# Patient Record
Sex: MALE | Race: WHITE | NOT HISPANIC OR LATINO | Employment: OTHER | URBAN - NONMETROPOLITAN AREA
[De-identification: names, ages, dates, MRNs, and addresses within clinical notes are randomized per-mention and may not be internally consistent; named-entity substitution may affect disease eponyms.]

---

## 2018-07-05 ENCOUNTER — HOSPITAL ENCOUNTER (INPATIENT)
Facility: HOSPITAL | Age: 73
LOS: 3 days | Discharge: HOME | End: 2018-07-08
Attending: FAMILY MEDICINE | Admitting: INTERNAL MEDICINE

## 2018-07-05 ENCOUNTER — APPOINTMENT (OUTPATIENT)
Dept: CT IMAGING | Facility: HOSPITAL | Age: 73
End: 2018-07-05

## 2018-07-05 DIAGNOSIS — M54.2 NECK PAIN: ICD-10-CM

## 2018-07-05 DIAGNOSIS — R52 INTRACTABLE PAIN: Primary | ICD-10-CM

## 2018-07-05 PROBLEM — R50.9 FEVER: Status: ACTIVE | Noted: 2018-07-05

## 2018-07-05 PROBLEM — I10 HYPERTENSION: Status: ACTIVE | Noted: 2018-07-05

## 2018-07-05 PROBLEM — R51.9 HEADACHE: Status: ACTIVE | Noted: 2018-07-05

## 2018-07-05 PROBLEM — R74.01 TRANSAMINITIS: Status: ACTIVE | Noted: 2018-07-05

## 2018-07-05 LAB
ALBUMIN SERPL-MCNC: 4 G/DL (ref 3.5–5)
ALBUMIN/GLOB SERPL: 1.4 G/DL (ref 1.1–2.5)
ALP SERPL-CCNC: 71 U/L (ref 24–120)
ALT SERPL W P-5'-P-CCNC: 60 U/L (ref 0–54)
ANION GAP SERPL CALCULATED.3IONS-SCNC: 11 MMOL/L (ref 4–13)
AST SERPL-CCNC: 42 U/L (ref 7–45)
BASOPHILS # BLD AUTO: 0.05 10*3/MM3 (ref 0–0.2)
BASOPHILS NFR BLD AUTO: 0.5 % (ref 0–2)
BILIRUB SERPL-MCNC: 0.8 MG/DL (ref 0.1–1)
BUN BLD-MCNC: 15 MG/DL (ref 5–21)
BUN/CREAT SERPL: 18.3 (ref 7–25)
CALCIUM SPEC-SCNC: 8.9 MG/DL (ref 8.4–10.4)
CHLORIDE SERPL-SCNC: 97 MMOL/L (ref 98–110)
CO2 SERPL-SCNC: 28 MMOL/L (ref 24–31)
CREAT BLD-MCNC: 0.82 MG/DL (ref 0.5–1.4)
CRP SERPL-MCNC: 4.21 MG/DL (ref 0–0.99)
DEPRECATED RDW RBC AUTO: 43.3 FL (ref 40–54)
EOSINOPHIL # BLD AUTO: 0.01 10*3/MM3 (ref 0–0.7)
EOSINOPHIL NFR BLD AUTO: 0.1 % (ref 0–4)
ERYTHROCYTE [DISTWIDTH] IN BLOOD BY AUTOMATED COUNT: 13.2 % (ref 12–15)
ERYTHROCYTE [SEDIMENTATION RATE] IN BLOOD: 3 MM/HR (ref 0–15)
GFR SERPL CREATININE-BSD FRML MDRD: 93 ML/MIN/1.73
GLOBULIN UR ELPH-MCNC: 2.8 GM/DL
GLUCOSE BLD-MCNC: 108 MG/DL (ref 70–100)
HCT VFR BLD AUTO: 48.7 % (ref 40–52)
HGB BLD-MCNC: 16.4 G/DL (ref 14–18)
IMM GRANULOCYTES # BLD: 0.24 10*3/MM3 (ref 0–0.03)
IMM GRANULOCYTES NFR BLD: 2.5 % (ref 0–5)
LYMPHOCYTES # BLD AUTO: 0.98 10*3/MM3 (ref 0.72–4.86)
LYMPHOCYTES NFR BLD AUTO: 10.2 % (ref 15–45)
MCH RBC QN AUTO: 29.9 PG (ref 28–32)
MCHC RBC AUTO-ENTMCNC: 33.7 G/DL (ref 33–36)
MCV RBC AUTO: 88.7 FL (ref 82–95)
MONOCYTES # BLD AUTO: 0.47 10*3/MM3 (ref 0.19–1.3)
MONOCYTES NFR BLD AUTO: 4.9 % (ref 4–12)
NEUTROPHILS # BLD AUTO: 7.9 10*3/MM3 (ref 1.87–8.4)
NEUTROPHILS NFR BLD AUTO: 81.8 % (ref 39–78)
NRBC BLD MANUAL-RTO: 0 /100 WBC (ref 0–0)
PLATELET # BLD AUTO: 129 10*3/MM3 (ref 130–400)
PMV BLD AUTO: 8.4 FL (ref 6–12)
POTASSIUM BLD-SCNC: 3.8 MMOL/L (ref 3.5–5.3)
PROCALCITONIN SERPL-MCNC: 0.26 NG/ML
PROT SERPL-MCNC: 6.8 G/DL (ref 6.3–8.7)
RBC # BLD AUTO: 5.49 10*6/MM3 (ref 4.8–5.9)
SODIUM BLD-SCNC: 136 MMOL/L (ref 135–145)
TROPONIN I SERPL-MCNC: 0.02 NG/ML (ref 0–0.03)
WBC NRBC COR # BLD: 9.65 10*3/MM3 (ref 4.8–10.8)

## 2018-07-05 PROCEDURE — 25010000002 ENOXAPARIN PER 10 MG: Performed by: FAMILY MEDICINE

## 2018-07-05 PROCEDURE — 25010000002 HYDROMORPHONE PER 4 MG: Performed by: NURSE PRACTITIONER

## 2018-07-05 PROCEDURE — 25010000002 KETOROLAC TROMETHAMINE PER 15 MG: Performed by: NURSE PRACTITIONER

## 2018-07-05 PROCEDURE — 99285 EMERGENCY DEPT VISIT HI MDM: CPT

## 2018-07-05 PROCEDURE — 86140 C-REACTIVE PROTEIN: CPT | Performed by: FAMILY MEDICINE

## 2018-07-05 PROCEDURE — 93010 ELECTROCARDIOGRAM REPORT: CPT | Performed by: INTERNAL MEDICINE

## 2018-07-05 PROCEDURE — 25010000002 METHYLPREDNISOLONE PER 125 MG: Performed by: NURSE PRACTITIONER

## 2018-07-05 PROCEDURE — 84145 PROCALCITONIN (PCT): CPT | Performed by: FAMILY MEDICINE

## 2018-07-05 PROCEDURE — 87040 BLOOD CULTURE FOR BACTERIA: CPT | Performed by: NURSE PRACTITIONER

## 2018-07-05 PROCEDURE — 94799 UNLISTED PULMONARY SVC/PX: CPT

## 2018-07-05 PROCEDURE — 80053 COMPREHEN METABOLIC PANEL: CPT | Performed by: NURSE PRACTITIONER

## 2018-07-05 PROCEDURE — 72125 CT NECK SPINE W/O DYE: CPT

## 2018-07-05 PROCEDURE — 93005 ELECTROCARDIOGRAM TRACING: CPT | Performed by: NURSE PRACTITIONER

## 2018-07-05 PROCEDURE — 84484 ASSAY OF TROPONIN QUANT: CPT | Performed by: NURSE PRACTITIONER

## 2018-07-05 PROCEDURE — 25010000002 ONDANSETRON PER 1 MG: Performed by: NURSE PRACTITIONER

## 2018-07-05 PROCEDURE — 85651 RBC SED RATE NONAUTOMATED: CPT | Performed by: NURSE PRACTITIONER

## 2018-07-05 PROCEDURE — 85025 COMPLETE CBC W/AUTO DIFF WBC: CPT | Performed by: NURSE PRACTITIONER

## 2018-07-05 RX ORDER — ACETAMINOPHEN 325 MG/1
650 TABLET ORAL EVERY 4 HOURS PRN
Status: DISCONTINUED | OUTPATIENT
Start: 2018-07-05 | End: 2018-07-08 | Stop reason: HOSPADM

## 2018-07-05 RX ORDER — TIZANIDINE 4 MG/1
4 TABLET ORAL EVERY 8 HOURS PRN
Status: DISCONTINUED | OUTPATIENT
Start: 2018-07-05 | End: 2018-07-08 | Stop reason: HOSPADM

## 2018-07-05 RX ORDER — ONDANSETRON 2 MG/ML
4 INJECTION INTRAMUSCULAR; INTRAVENOUS ONCE
Status: COMPLETED | OUTPATIENT
Start: 2018-07-05 | End: 2018-07-05

## 2018-07-05 RX ORDER — OXYCODONE AND ACETAMINOPHEN 7.5; 325 MG/1; MG/1
1 TABLET ORAL EVERY 4 HOURS PRN
Status: DISCONTINUED | OUTPATIENT
Start: 2018-07-05 | End: 2018-07-08 | Stop reason: HOSPADM

## 2018-07-05 RX ORDER — TIZANIDINE 4 MG/1
4 TABLET ORAL EVERY 8 HOURS PRN
Status: ON HOLD | COMMUNITY
End: 2018-07-08

## 2018-07-05 RX ORDER — METHYLPREDNISOLONE SODIUM SUCCINATE 125 MG/2ML
125 INJECTION, POWDER, LYOPHILIZED, FOR SOLUTION INTRAMUSCULAR; INTRAVENOUS ONCE
Status: COMPLETED | OUTPATIENT
Start: 2018-07-05 | End: 2018-07-05

## 2018-07-05 RX ORDER — LOSARTAN POTASSIUM 50 MG/1
100 TABLET ORAL DAILY
Status: ON HOLD | COMMUNITY
End: 2018-07-08

## 2018-07-05 RX ORDER — SODIUM CHLORIDE 0.9 % (FLUSH) 0.9 %
1-10 SYRINGE (ML) INJECTION AS NEEDED
Status: DISCONTINUED | OUTPATIENT
Start: 2018-07-05 | End: 2018-07-08 | Stop reason: HOSPADM

## 2018-07-05 RX ORDER — ATORVASTATIN CALCIUM 10 MG/1
20 TABLET, FILM COATED ORAL NIGHTLY
Status: DISCONTINUED | OUTPATIENT
Start: 2018-07-05 | End: 2018-07-05 | Stop reason: SDUPTHER

## 2018-07-05 RX ORDER — AMLODIPINE BESYLATE 2.5 MG/1
2.5 TABLET ORAL DAILY
COMMUNITY
End: 2021-06-17

## 2018-07-05 RX ORDER — KETOROLAC TROMETHAMINE 15 MG/ML
15 INJECTION, SOLUTION INTRAMUSCULAR; INTRAVENOUS ONCE
Status: COMPLETED | OUTPATIENT
Start: 2018-07-05 | End: 2018-07-05

## 2018-07-05 RX ORDER — AMLODIPINE BESYLATE 5 MG/1
2.5 TABLET ORAL DAILY
Status: DISCONTINUED | OUTPATIENT
Start: 2018-07-05 | End: 2018-07-08 | Stop reason: HOSPADM

## 2018-07-05 RX ORDER — SIMVASTATIN 40 MG
40 TABLET ORAL NIGHTLY
COMMUNITY

## 2018-07-05 RX ORDER — BISACODYL 5 MG/1
5 TABLET, DELAYED RELEASE ORAL DAILY PRN
Status: DISCONTINUED | OUTPATIENT
Start: 2018-07-05 | End: 2018-07-08 | Stop reason: HOSPADM

## 2018-07-05 RX ORDER — LOSARTAN POTASSIUM 50 MG/1
100 TABLET ORAL DAILY
Status: DISCONTINUED | OUTPATIENT
Start: 2018-07-05 | End: 2018-07-07

## 2018-07-05 RX ORDER — HYDROCODONE BITARTRATE AND ACETAMINOPHEN 7.5; 325 MG/1; MG/1
1 TABLET ORAL EVERY 4 HOURS PRN
Status: DISCONTINUED | OUTPATIENT
Start: 2018-07-05 | End: 2018-07-07

## 2018-07-05 RX ORDER — HYDROMORPHONE HYDROCHLORIDE 1 MG/ML
0.5 INJECTION, SOLUTION INTRAMUSCULAR; INTRAVENOUS; SUBCUTANEOUS ONCE
Status: COMPLETED | OUTPATIENT
Start: 2018-07-05 | End: 2018-07-05

## 2018-07-05 RX ORDER — ALUMINA, MAGNESIA, AND SIMETHICONE 2400; 2400; 240 MG/30ML; MG/30ML; MG/30ML
15 SUSPENSION ORAL EVERY 6 HOURS PRN
Status: DISCONTINUED | OUTPATIENT
Start: 2018-07-05 | End: 2018-07-08 | Stop reason: HOSPADM

## 2018-07-05 RX ORDER — ATORVASTATIN CALCIUM 10 MG/1
20 TABLET, FILM COATED ORAL DAILY
Status: DISCONTINUED | OUTPATIENT
Start: 2018-07-06 | End: 2018-07-08 | Stop reason: HOSPADM

## 2018-07-05 RX ORDER — ONDANSETRON 2 MG/ML
4 INJECTION INTRAMUSCULAR; INTRAVENOUS EVERY 6 HOURS PRN
Status: DISCONTINUED | OUTPATIENT
Start: 2018-07-05 | End: 2018-07-08 | Stop reason: HOSPADM

## 2018-07-05 RX ADMIN — METHYLPREDNISOLONE SODIUM SUCCINATE 125 MG: 125 INJECTION, POWDER, FOR SOLUTION INTRAMUSCULAR; INTRAVENOUS at 09:51

## 2018-07-05 RX ADMIN — ENOXAPARIN SODIUM 40 MG: 40 INJECTION SUBCUTANEOUS at 18:28

## 2018-07-05 RX ADMIN — HYDROMORPHONE HYDROCHLORIDE 0.5 MG: 1 INJECTION, SOLUTION INTRAMUSCULAR; INTRAVENOUS; SUBCUTANEOUS at 09:48

## 2018-07-05 RX ADMIN — KETOROLAC TROMETHAMINE 15 MG: 15 INJECTION, SOLUTION INTRAMUSCULAR; INTRAVENOUS at 09:49

## 2018-07-05 RX ADMIN — ONDANSETRON 4 MG: 2 INJECTION, SOLUTION INTRAMUSCULAR; INTRAVENOUS at 09:47

## 2018-07-05 RX ADMIN — SODIUM CHLORIDE 500 ML: 9 INJECTION, SOLUTION INTRAVENOUS at 09:47

## 2018-07-05 RX ADMIN — DOXYCYCLINE 100 MG: 100 INJECTION, POWDER, LYOPHILIZED, FOR SOLUTION INTRAVENOUS at 18:29

## 2018-07-05 NOTE — PLAN OF CARE
Problem: Patient Care Overview  Goal: Plan of Care Review  Outcome: Ongoing (interventions implemented as appropriate)   07/05/18 3741   Coping/Psychosocial   Plan of Care Reviewed With patient   Plan of Care Review   Progress no change   OTHER   Outcome Summary Admitted with a one month history of neck pain. Seen at [facility] and followed by PCP with no relief. Awaiting visit from hospitalist and Dr. Woods.        Problem: Pain, Acute (Adult)  Goal: Acceptable Pain Control/Comfort Level  Outcome: Ongoing (interventions implemented as appropriate)

## 2018-07-05 NOTE — PROGRESS NOTES
Orthopedic Spine Progress Note    Maxx Siu  73 y.o.  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  He also had a fever with profuse sweating.  The patient has had problems with arthritis in both shoulders and had been laying awkwardly on his couch with his head in a flexed position against the armrest for several days.  He denies any specific trauma or other injuries.  His neck pain comes and goes but does seem to be associated with neck range of motion.  He has been quite active.  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  He describes no symptoms radiating down the arms and he denies any weakness.  He also denies any bowel or bladder incontinence.  He does have a history of prostate issues and has dribbled urine at times recently but this is of unknown significance.    He is complaining today mainly of right sided neck pain as well as symptoms into the right trapezius.  About a month ago, he had extreme stiffness in his neck with difficulty turning his head to drive.  His primary care physician Dr. Gustavo Hairston in [place] has given him oral steroids for the last 10-12 days with minimal relief, he also was given a prescription for tizanidine.  Here in the [facility] ER he was given Solu-Medrol and Toradol.      He did have a temperature in the emergency room today and has been sweating significantly.  Several tests have been ordered for a workup regarding possible tickborne illness.    Objective     Vital signs in last 24 hours:  Temp:  [97.4 °F (36.3 °C)-100.1 °F (37.8 °C)] 97.4 °F (36.3 °C)  Heart Rate:  [60-89] 60  Resp:  [18] 18  BP: (126-161)/(44-97) 131/71    Physical Exam:    His neck range of motion today is actually reasonably well preserved.  He is nontender to palpation over the  paraspinous musculature the cervical spine or the trapezius.  He appears to have normal strength in the deltoids, biceps, triceps.  He has normal  strength.  Sensation is intact in all nerve system fusions.  Donta's is negative.  He moves both lower extremities appropriately with no difficulty.    Diagnostic Data:  CBC:  Results from last 7 days  Lab Units 07/05/18  0946   WBC 10*3/mm3 9.65   RBC 10*6/mm3 5.49   HEMOGLOBIN g/dL 16.4   HEMATOCRIT % 48.7   PLATELETS 10*3/mm3 129*      CT scan cervical spine reveals diffuse degenerative disc disease with multilevel facet arthropathy.    Assessment/Plan     1.  Right-sided neck pain  2.  Bilateral shoulder radiculopathy, right worse than left  3.  Multilevel cervical degenerative disc disease, worse C5-7  4.  Multilevel cervical facet arthropathy  5.  Possible cervical disc herniation  6.  Possible global infectious process    Plan:  1.  Pain control  2.  MRI cervical ordered already, will follow  3.  Hold off on more steroids for now per primary team      JYOTI Woods MD    Date: 7/5/2018  Time: 5:37 PM

## 2018-07-05 NOTE — PLAN OF CARE
Problem: Pain, Acute (Adult)  Goal: Identify Related Risk Factors and Signs and Symptoms  Outcome: Outcome(s) achieved Date Met: 07/05/18

## 2018-07-05 NOTE — ED PROVIDER NOTES
Subjective   Patient is a 73-year-old male presents emergency Department with complaints of neck pain.  Patient states that he has had neck pain for approximately a month.  Patient was evaluated at [facility] on 06/03/2018 with complaints of neck pain and headache symptoms.  On this date patient had a CT scan of his head which was found to be unremarkable for acute findings.  Labs were all unremarkable as well.  Lumbar puncture was also performed to rule out meningitis.  This was found to be within normal limits as well.  CT scan was read as remote lacunar infarct within the right basal ganglia new when compared to the prior exam of 07/25/2013, remote lacunar infarct within the left basal ganglia unchanged, minimal mild deep white matter disease, chronic right ethmoid sinusitis, exam is otherwise within normal limits for patient's age with no acute intracranial abnormality identified.  Patient was offered admission on this date for intractable pain however declined.  He was sent home with naproxen, Tylenol with Codeine, and Zofran.  He was instructed to follow-up with primary care physician.  Patient states he is now on his second round of prednisone pack as well as muscle relaxers.  He continues to have neck pain that radiates up into his head.  Today he has recorded temp of 100.6.  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  He has had no insect or tick bites that he is aware of.  Due to symptoms described he elected to come the emergency department for evaluation treatment.  Patient notes that he has an appointment with Dr. Cruz on July 18 however due to continued symptoms did not feel he could wait until this date.        Neck Pain   Pain location:  R side  Quality:  Unable to specify  Timing:  Intermittent  Chronicity:  Chronic  Context: not recent injury    Relieved by: being still helps with the pain.  Worsened by:  Position  Ineffective treatments:  NSAIDs  and muscle relaxants  Associated symptoms: fever and headaches    Associated symptoms: no chest pain, no numbness, no photophobia and no visual change    Risk factors: no hx of head and neck radiation, no recent head injury and no recurrent falls        Review of Systems   Constitutional: Positive for activity change, diaphoresis and fever. Negative for appetite change, chills and fatigue.   HENT: Negative for congestion, ear pain and sinus pressure.    Eyes: Negative for photophobia, pain and visual disturbance.   Respiratory: Negative for cough, chest tightness, shortness of breath and wheezing.    Cardiovascular: Negative for chest pain.   Gastrointestinal: Negative for abdominal pain, diarrhea, nausea and vomiting.   Musculoskeletal: Positive for neck pain. Negative for arthralgias, back pain and neck stiffness.   Skin: Negative for color change and rash.   Neurological: Positive for headaches. Negative for dizziness and numbness.   Psychiatric/Behavioral: Negative for confusion and sleep disturbance. The patient is not nervous/anxious.        Past Medical History:   Diagnosis Date   • Basal cell carcinoma     r ear   • Hypertension        Allergies   Allergen Reactions   • Lortab [Hydrocodone-Acetaminophen] Nausea And Vomiting       Past Surgical History:   Procedure Laterality Date   • KNEE MENISCECTOMY Right        History reviewed. No pertinent family history.    Social History     Social History   • Marital status: Single     Social History Main Topics   • Smoking status: Never Smoker   • Smokeless tobacco: Never Used   • Alcohol use Yes      Comment: 2-3 times weekly; liquor and beer   • Drug use: No     Other Topics Concern   • Not on file           Objective   Physical Exam   Constitutional: He is oriented to person, place, and time. He appears well-developed and well-nourished. No distress.   HENT:   Head: Atraumatic.   Nose: Nose normal.   Mouth/Throat: Oropharynx is clear and moist.   Eyes:  Conjunctivae are normal. Pupils are equal, round, and reactive to light. No scleral icterus.   Neck: Normal range of motion. Neck supple. No JVD present. No thyromegaly present.   Patient has diffuse soft tissue tenderness of the cervical spine with significant pain to the right side of the neck; no vertebral point tenderness; pain worse with positioning   Cardiovascular: Normal rate, regular rhythm, normal heart sounds and intact distal pulses.    No murmur heard.  Pulmonary/Chest: Effort normal and breath sounds normal. No respiratory distress. He has no wheezes. He has no rales. He exhibits no tenderness.   Abdominal: Soft. Bowel sounds are normal. He exhibits no distension and no mass. There is no tenderness. There is no rebound and no guarding.   Musculoskeletal: Normal range of motion. He exhibits no edema.   Lymphadenopathy:     He has no cervical adenopathy.   Neurological: He is alert and oriented to person, place, and time. He has normal reflexes. No cranial nerve deficit. Coordination normal.   Skin: Skin is warm and dry. Capillary refill takes less than 2 seconds. No rash noted. He is not diaphoretic. No erythema. No pallor.   Psychiatric: He has a normal mood and affect. His behavior is normal. Judgment and thought content normal.   Nursing note and vitals reviewed.      Procedures           ED Course on reexam patient has little improvement of overall pain.  He is quite diaphoretic.  Patient's gown and bed linens had to be changed due to extensive sweating.  Cool wash rags were applied.  Plan at this time is to admit with intractable neck pain.  Please see hospitalist note for further details.  ED Course as of Jul 05 1312   Thu Jul 05, 2018   1214 Twelve-lead EKG at 1144 reveals a sinus rate at 62/m no acute ST changes no PVCs no ectopic beats noted.  [KP]      ED Course User Index  [KP] Kimber SAUL MD                  [facility]  Number of Diagnoses or Management Options  Intractable pain: new and  requires workup  Neck pain: new and requires workup     Amount and/or Complexity of Data Reviewed  Clinical lab tests: ordered  Tests in the radiology section of CPT®: ordered and reviewed  Decide to obtain previous medical records or to obtain history from someone other than the patient: yes  Obtain history from someone other than the patient: yes  Discuss the patient with other providers: yes    Risk of Complications, Morbidity, and/or Mortality  Presenting problems: moderate  Diagnostic procedures: moderate  Management options: moderate    Patient Progress  Patient progress: improved        Final diagnoses:   Intractable pain   Neck pain            Gemma Dahl, ABELARDO  07/05/18 1325       Gemma Dahl, ABELARDO  07/05/18 1337

## 2018-07-05 NOTE — H&P
[facility] Emergency Room Medicine Services  HISTORY AND PHYSICAL    Date of Admission: 7/5/2018  Primary Care Physician: Gustavo Hairston MD    Subjective     Chief Complaint:   Neck pain and fever    History of Present Illness    73-year-old white male is admitted with a chief bite of neck pain and fever.  The patient states that his pain has been ongoing now for roughly 1 month and is associated with stiffness.  He went to [facility] today for the aforementioned symptoms and they performed a CT scan of the head which showed no acute findings.  Laboratory evaluation was stated as unremarkable.  A lumbar puncture was performed to rule out meningitis and was found to be within normal limits.  Patient has had an outpatient evaluation performed by his family physician consisting of x-rays which showed mild degenerative changes in the cervical spine.  He has been treated with muscle relaxants, anti-inflammatories and tapering steroid packs without relief of his symptoms.  While in our emergency department the patient developed a fever associated with profuse sweating and was subsequently admitted.  The patient admits to recurrent nocturnal sweats with subjective fever.      The patient denies any changes in his activity.  He works full-time as a farmer.  He does not relate any recent history of insect or tick bites or tick borne illness certainly remains strongly in the differential.      Review of Systems   Constitutional: Positive for diaphoresis and fever.   HENT: Negative.    Eyes: Negative.    Respiratory: Negative.    Cardiovascular: Negative.    Gastrointestinal: Negative.    Endocrine: Negative.    Genitourinary: Negative.    Musculoskeletal: Positive for myalgias, neck pain and neck stiffness.   Skin: Negative.    Allergic/Immunologic: Negative.    Neurological: Negative.    Hematological: Negative.    Psychiatric/Behavioral: Negative.         Otherwise complete ROS reviewed  "and negative except as mentioned in the HPI.      Past Medical History:     Past Medical History:   Diagnosis Date   • Arthritis    • Basal cell carcinoma     r ear   • Basal cell carcinoma    • Elevated cholesterol    • Hypertension        Past Surgical History:  Past Surgical History:   Procedure Laterality Date   • KNEE MENISCECTOMY Right        Family History:   family history includes COPD in his father; Cancer in his father and mother; Dementia in his mother; Diabetes in his mother; Hyperlipidemia in his sister; Hypertension in his sister; Myasthenia gravis in his father; No Known Problems in his brother, brother, and sister.    Social History:    reports that he has never smoked. He has never used smokeless tobacco. He reports that he drinks about 1.8 oz of alcohol per week . He reports that he does not use drugs.    Medications:  Prior to Admission medications    Medication Sig Start Date End Date Taking? Authorizing Provider   amLODIPine (NORVASC) 2.5 MG tablet Take 2.5 mg by mouth Daily.   Yes Historical Provider, MD   losartan (COZAAR) 50 MG tablet Take 100 mg by mouth Daily.   Yes Historical Provider, MD   simvastatin (ZOCOR) 40 MG tablet Take 40 mg by mouth Every Night.   Yes Historical Provider, MD   tiZANidine (ZANAFLEX) 4 MG tablet Take 4 mg by mouth Every 8 (Eight) Hours As Needed for Muscle Spasms.   Yes Historical Provider, MD   Ezetimibe-Simvastatin (VYTORIN PO) Take  by mouth.  7/5/18 Yes Historical Provider, MD   LOSARTAN POTASSIUM PO Take  by mouth.  7/5/18 Yes Historical Provider, MD   PREDNISONE PO Take  by mouth.  7/5/18 Yes Historical Provider, MD   UNKNOWN TO PATIENT amlodipine  7/5/18 Yes Historical Provider, MD       Allergies:  Allergies   Allergen Reactions   • Lortab [Hydrocodone-Acetaminophen] Nausea And Vomiting       Objective     Vital Signs:   /71 (BP Location: Left arm, Patient Position: Lying)   Pulse 60   Temp 97.4 °F (36.3 °C) (Oral)   Resp 18   Ht 172.7 cm (68\")  "  Wt 94.5 kg (208 lb 6.4 oz)   SpO2 97%   BMI 31.69 kg/m²     Physical Exam   Constitutional: He is oriented to person, place, and time. He appears well-developed and well-nourished. He is cooperative. He does not have a sickly appearance. No distress.   HENT:   Head: Normocephalic and atraumatic.   Right Ear: External ear normal.   Left Ear: External ear normal.   Nose: Nose normal.   Mouth/Throat: Oropharynx is clear and moist.   Eyes: Conjunctivae and EOM are normal. Pupils are equal, round, and reactive to light. No scleral icterus.   Neck: Neck supple. No JVD present. Carotid bruit is not present. No neck rigidity. No tracheal deviation present. No thyromegaly present.   There is mild to moderate palpable spasm in the right posterior cervical region.  Passive range of motion appears to be within normal limits.  Active range of motion appears to be mildly decreased.     Cardiovascular: Normal rate, regular rhythm, normal heart sounds and intact distal pulses.    No murmur heard.  Pulmonary/Chest: Effort normal and breath sounds normal. No respiratory distress. He has no wheezes.   Abdominal: Soft. Bowel sounds are normal. He exhibits no distension. There is no tenderness. A hernia is present. Hernia confirmed positive in the ventral area.   Musculoskeletal: Normal range of motion. He exhibits edema (trace  BLE). He exhibits no tenderness.   Neurological: He is alert and oriented to person, place, and time. No cranial nerve deficit. Coordination normal.   Skin: Skin is warm and dry. No rash noted.   There are no visible target lesions and no perceptible skin eruptions.    Psychiatric: He has a normal mood and affect. His behavior is normal. Judgment and thought content normal.           Results Reviewed:  Lab Results (last 24 hours)     Procedure Component Value Units Date/Time    Procalcitonin [[id number]]  (Abnormal) Collected:  07/05/18 0946    Specimen:  Blood Updated:  07/05/18 1400     Procalcitonin 0.26  (H) ng/mL     C-reactive Protein [[id number]]  (Abnormal) Collected:  07/05/18 0946    Specimen:  Blood Updated:  07/05/18 1343     C-Reactive Protein 4.21 (H) mg/dL     Blood Culture - Blood, Blood, Venous Line [[id number]] Collected:  07/05/18 1154    Specimen:  Blood from Hand, Left Updated:  07/05/18 1229    Blood Culture - Blood, Blood, Venous Line [[id number]] Collected:  07/05/18 1156    Specimen:  Blood from Hand, Left Updated:  07/05/18 1229    Troponin [[id number]]  (Normal) Collected:  07/05/18 0946    Specimen:  Blood Updated:  07/05/18 1205     Troponin I 0.023 ng/mL     Sedimentation Rate [[id number]]  (Normal) Collected:  07/05/18 0946    Specimen:  Blood Updated:  07/05/18 1010     Sed Rate 3 mm/hr     Comprehensive Metabolic Panel [[id number]]  (Abnormal) Collected:  07/05/18 0946    Specimen:  Blood Updated:  07/05/18 1007     Glucose 108 (H) mg/dL      BUN 15 mg/dL      Creatinine 0.82 mg/dL      Sodium 136 mmol/L      Potassium 3.8 mmol/L      Chloride 97 (L) mmol/L      CO2 28.0 mmol/L      Calcium 8.9 mg/dL      Total Protein 6.8 g/dL      Albumin 4.00 g/dL      ALT (SGPT) 60 (H) U/L      AST (SGOT) 42 U/L      Alkaline Phosphatase 71 U/L      Total Bilirubin 0.8 mg/dL      eGFR Non African Amer 93 mL/min/1.73      Globulin 2.8 gm/dL      A/G Ratio 1.4 g/dL      BUN/Creatinine Ratio 18.3     Anion Gap 11.0 mmol/L     CBC Auto Differential [[id number]]  (Abnormal) Collected:  07/05/18 0946    Specimen:  Blood Updated:  07/05/18 1001     WBC 9.65 10*3/mm3      RBC 5.49 10*6/mm3      Hemoglobin 16.4 g/dL      Hematocrit 48.7 %      MCV 88.7 fL      MCH 29.9 pg      MCHC 33.7 g/dL      RDW 13.2 %      RDW-SD 43.3 fl      MPV 8.4 fL      Platelets 129 (L) 10*3/mm3      Neutrophil % 81.8 (H) %      Lymphocyte % 10.2 (L) %      Monocyte % 4.9 %      Eosinophil % 0.1 %      Basophil % 0.5 %      Immature Grans % 2.5 %      Neutrophils, Absolute 7.90 10*3/mm3      Lymphocytes, Absolute 0.98 10*3/mm3       Monocytes, Absolute 0.47 10*3/mm3      Eosinophils, Absolute 0.01 10*3/mm3      Basophils, Absolute 0.05 10*3/mm3      Immature Grans, Absolute 0.24 (H) 10*3/mm3      nRBC 0.0 /100 WBC           Ct Cervical Spine Without Contrast    Result Date: 7/5/2018  Narrative: CT CERVICAL SPINE WO CONTRAST- 7/5/2018 10:53 AM CDT  HISTORY: neck pain  COMPARISON: None  DOSE LENGTH PRODUCT: 306 mGy cm. Automated exposure control was also utilized to decrease patient radiation dose.  TECHNIQUE: Serial helical tomographic images of the cervical spine were obtained without the use of intravenous contrast. Additionally, sagittal and coronal reformatted images were also provided for review.  FINDINGS: Alignment: Normal.  Bones: There is no evidence of fracture. Vertebral body heights are maintained.  Disc spaces: Maintained. Small osteophytes are present at C5-C6 and C6-C7.  Canal and neuroforamina: Patent. Degenerative changes are present in the RIGHT C4-C5 and C5-C6 facet joints.  Soft tissues: Unremarkable.  Lung apices: Clear. No pneumothorax.      Impression: 1. Mild degenerative changes in the cervical spine without evidence of acute bony abnormality.   This report was finalized on 07/05/2018 11:10 by Dr. Gustavo Kong MD.     I have personally reviewed and interpreted the radiology studies and ECG obtained at time of admission.     Assessment / Plan      Assessment & Plan  Hospital Problem List     * (Principal)Intractable pain    Fever    Transaminitis    Cervicalgia    Hypertension    Headache          PLAN:   Admit to the medical surgical floor  Ehrlichia chaffeensis, PCR  RMSF antibodies  MRI scan cervical spine  Consult neurosurgery at the family's request  Doxycycline 100 mg IV every 12 hours    Code Status:   Full  Surrogate decision-maker is the patient's wife     I discussed the patient's findings and my recommendations with: The patient, his wife    Estimated length of stay: Unknown    Ok Workman DO    07/05/18   5:19 PM

## 2018-07-06 ENCOUNTER — APPOINTMENT (OUTPATIENT)
Dept: MRI IMAGING | Facility: HOSPITAL | Age: 73
End: 2018-07-06

## 2018-07-06 LAB
ALBUMIN SERPL-MCNC: 3.6 G/DL (ref 3.5–5)
ALBUMIN/GLOB SERPL: 1.3 G/DL (ref 1.1–2.5)
ALP SERPL-CCNC: 69 U/L (ref 24–120)
ALT SERPL W P-5'-P-CCNC: 69 U/L (ref 0–54)
ANION GAP SERPL CALCULATED.3IONS-SCNC: 14 MMOL/L (ref 4–13)
AST SERPL-CCNC: 54 U/L (ref 7–45)
BASOPHILS # BLD AUTO: 0.02 10*3/MM3 (ref 0–0.2)
BASOPHILS NFR BLD AUTO: 0.2 % (ref 0–2)
BILIRUB SERPL-MCNC: 0.7 MG/DL (ref 0.1–1)
BUN BLD-MCNC: 29 MG/DL (ref 5–21)
BUN/CREAT SERPL: 22 (ref 7–25)
CALCIUM SPEC-SCNC: 8.8 MG/DL (ref 8.4–10.4)
CHLORIDE SERPL-SCNC: 99 MMOL/L (ref 98–110)
CO2 SERPL-SCNC: 27 MMOL/L (ref 24–31)
CREAT BLD-MCNC: 1.32 MG/DL (ref 0.5–1.4)
DEPRECATED RDW RBC AUTO: 43.6 FL (ref 40–54)
EOSINOPHIL # BLD AUTO: 0 10*3/MM3 (ref 0–0.7)
EOSINOPHIL NFR BLD AUTO: 0 % (ref 0–4)
ERYTHROCYTE [DISTWIDTH] IN BLOOD BY AUTOMATED COUNT: 13.3 % (ref 12–15)
GFR SERPL CREATININE-BSD FRML MDRD: 53 ML/MIN/1.73
GLOBULIN UR ELPH-MCNC: 2.8 GM/DL
GLUCOSE BLD-MCNC: 144 MG/DL (ref 70–100)
HBA1C MFR BLD: 6.2 %
HCT VFR BLD AUTO: 46.5 % (ref 40–52)
HGB BLD-MCNC: 15.9 G/DL (ref 14–18)
IMM GRANULOCYTES # BLD: 0.11 10*3/MM3 (ref 0–0.03)
IMM GRANULOCYTES NFR BLD: 1 % (ref 0–5)
LYMPHOCYTES # BLD AUTO: 0.69 10*3/MM3 (ref 0.72–4.86)
LYMPHOCYTES NFR BLD AUTO: 6 % (ref 15–45)
MCH RBC QN AUTO: 30.5 PG (ref 28–32)
MCHC RBC AUTO-ENTMCNC: 34.2 G/DL (ref 33–36)
MCV RBC AUTO: 89.1 FL (ref 82–95)
MONOCYTES # BLD AUTO: 0.43 10*3/MM3 (ref 0.19–1.3)
MONOCYTES NFR BLD AUTO: 3.7 % (ref 4–12)
NEUTROPHILS # BLD AUTO: 10.22 10*3/MM3 (ref 1.87–8.4)
NEUTROPHILS NFR BLD AUTO: 89.1 % (ref 39–78)
NRBC BLD MANUAL-RTO: 0 /100 WBC (ref 0–0)
PLATELET # BLD AUTO: 133 10*3/MM3 (ref 130–400)
PMV BLD AUTO: 9.4 FL (ref 6–12)
POTASSIUM BLD-SCNC: 4.5 MMOL/L (ref 3.5–5.3)
PROT SERPL-MCNC: 6.4 G/DL (ref 6.3–8.7)
RBC # BLD AUTO: 5.22 10*6/MM3 (ref 4.8–5.9)
SODIUM BLD-SCNC: 140 MMOL/L (ref 135–145)
TSH SERPL DL<=0.05 MIU/L-ACNC: 0.74 MIU/ML (ref 0.47–4.68)
WBC NRBC COR # BLD: 11.47 10*3/MM3 (ref 4.8–10.8)

## 2018-07-06 PROCEDURE — 94760 N-INVAS EAR/PLS OXIMETRY 1: CPT

## 2018-07-06 PROCEDURE — 25010000002 ENOXAPARIN PER 10 MG: Performed by: FAMILY MEDICINE

## 2018-07-06 PROCEDURE — 86757 RICKETTSIA ANTIBODY: CPT | Performed by: FAMILY MEDICINE

## 2018-07-06 PROCEDURE — 84443 ASSAY THYROID STIM HORMONE: CPT | Performed by: FAMILY MEDICINE

## 2018-07-06 PROCEDURE — 87798 DETECT AGENT NOS DNA AMP: CPT | Performed by: FAMILY MEDICINE

## 2018-07-06 PROCEDURE — 72141 MRI NECK SPINE W/O DYE: CPT

## 2018-07-06 PROCEDURE — 94799 UNLISTED PULMONARY SVC/PX: CPT

## 2018-07-06 PROCEDURE — 83036 HEMOGLOBIN GLYCOSYLATED A1C: CPT | Performed by: FAMILY MEDICINE

## 2018-07-06 PROCEDURE — 80053 COMPREHEN METABOLIC PANEL: CPT | Performed by: FAMILY MEDICINE

## 2018-07-06 PROCEDURE — 85025 COMPLETE CBC W/AUTO DIFF WBC: CPT | Performed by: FAMILY MEDICINE

## 2018-07-06 RX ADMIN — Medication 10 ML: at 05:20

## 2018-07-06 RX ADMIN — LOSARTAN POTASSIUM 100 MG: 50 TABLET, FILM COATED ORAL at 09:36

## 2018-07-06 RX ADMIN — ATORVASTATIN CALCIUM 20 MG: 10 TABLET, FILM COATED ORAL at 09:36

## 2018-07-06 RX ADMIN — ENOXAPARIN SODIUM 40 MG: 40 INJECTION SUBCUTANEOUS at 18:40

## 2018-07-06 RX ADMIN — DOXYCYCLINE 100 MG: 100 INJECTION, POWDER, LYOPHILIZED, FOR SOLUTION INTRAVENOUS at 18:36

## 2018-07-06 RX ADMIN — AMLODIPINE BESYLATE 2.5 MG: 5 TABLET ORAL at 09:35

## 2018-07-06 RX ADMIN — DOXYCYCLINE 100 MG: 100 INJECTION, POWDER, LYOPHILIZED, FOR SOLUTION INTRAVENOUS at 05:20

## 2018-07-06 NOTE — PROGRESS NOTES
[facility] Medicine Services  INPATIENT PROGRESS NOTE    Length of Stay: 1  Date of Admission: 7/5/2018  Primary Care Physician: Gustavo Hairston MD    Subjective     Chief Complaint:     Neck pain and fever    HPI     The patient's neck pain is significantly improved.  C-spine stiffness is significantly improved as well.  He has been afebrile since starting IV antibiotics yesterday.  Formal reading of the patient's cervical MRI is pending but to my untrained eye I see no evidence of central cord impingement or impingement of the exiting nerve roots.  Studies for ehrlichiosis and RMSF are pending.  The patient feels significantly better and is anxious to return home but is willing to stay for another day or 2 to receive IV antibiotics.    Review of Systems     All pertinent negatives and positives are as above. All other systems have been reviewed and are negative unless otherwise stated.     Objective    Temp:  [97.2 °F (36.2 °C)-98.7 °F (37.1 °C)] 97.8 °F (36.6 °C)  Heart Rate:  [56-81] 72  Resp:  [16-18] 16  BP: (115-137)/(66-74) 127/74    Lab Results (last 24 hours)     Procedure Component Value Units Date/Time    Blood Culture - Blood, Blood, Venous Line [[id number]]  (Normal) Collected:  07/05/18 1154    Specimen:  Blood from Hand, Left Updated:  07/06/18 1230     Blood Culture No growth at 24 hours    Blood Culture - Blood, Blood, Venous Line [[id number]]  (Normal) Collected:  07/05/18 1156    Specimen:  Blood from Hand, Left Updated:  07/06/18 1230     Blood Culture No growth at 24 hours    Hemoglobin A1c [[id number]] Collected:  07/06/18 0503    Specimen:  Blood Updated:  07/06/18 0712     Hemoglobin A1C 6.2 %     Narrative:       Less than 6.0           Non-Diabetic Range  6.0-7.0                 ADA Therapeutic Target  Greater than 7.0        Action Suggested    TSH [[id number]]  (Normal) Collected:  07/06/18 0503    Specimen:  Blood Updated:  07/06/18 0641     TSH 0.738  mIU/mL     Comprehensive Metabolic Panel [[id number]]  (Abnormal) Collected:  07/06/18 0503    Specimen:  Blood Updated:  07/06/18 0641     Glucose 144 (H) mg/dL      BUN 29 (H) mg/dL      Creatinine 1.32 mg/dL      Sodium 140 mmol/L      Potassium 4.5 mmol/L      Chloride 99 mmol/L      CO2 27.0 mmol/L      Calcium 8.8 mg/dL      Total Protein 6.4 g/dL      Albumin 3.60 g/dL      ALT (SGPT) 69 (H) U/L      AST (SGOT) 54 (H) U/L      Alkaline Phosphatase 69 U/L      Total Bilirubin 0.7 mg/dL      eGFR Non African Amer 53 (L) mL/min/1.73      Globulin 2.8 gm/dL      A/G Ratio 1.3 g/dL      BUN/Creatinine Ratio 22.0     Anion Gap 14.0 (H) mmol/L     Narrative:       The MDRD GFR formula is only valid for adults with stable renal function between ages 18 and 70.    CBC & Differential [[id number]] Collected:  07/06/18 0503    Specimen:  Blood Updated:  07/06/18 0558    Narrative:       The following orders were created for panel order CBC & Differential.  Procedure                               Abnormality         Status                     ---------                               -----------         ------                     CBC Auto Differential[[id number]]        Abnormal            Final result                 Please view results for these tests on the individual orders.    CBC Auto Differential [[id number]]  (Abnormal) Collected:  07/06/18 0503    Specimen:  Blood Updated:  07/06/18 0558     WBC 11.47 (H) 10*3/mm3      RBC 5.22 10*6/mm3      Hemoglobin 15.9 g/dL      Hematocrit 46.5 %      MCV 89.1 fL      MCH 30.5 pg      MCHC 34.2 g/dL      RDW 13.3 %      RDW-SD 43.6 fl      MPV 9.4 fL      Platelets 133 10*3/mm3      Neutrophil % 89.1 (H) %      Lymphocyte % 6.0 (L) %      Monocyte % 3.7 (L) %      Eosinophil % 0.0 %      Basophil % 0.2 %      Immature Grans % 1.0 %      Neutrophils, Absolute 10.22 (H) 10*3/mm3      Lymphocytes, Absolute 0.69 (L) 10*3/mm3      Monocytes, Absolute 0.43 10*3/mm3      Eosinophils,  Absolute 0.00 10*3/mm3      Basophils, Absolute 0.02 10*3/mm3      Immature Grans, Absolute 0.11 (H) 10*3/mm3      nRBC 0.0 /100 WBC     Ehrlichia Profile DNA PCR [[id number]] Collected:  07/06/18 0503    Specimen:  Blood Updated:  07/06/18 0553    Ehrlichia Chaffeensis PCR [[id number]] Collected:  07/06/18 0503    Specimen:  Blood Updated:  07/06/18 0553    Rison SF (IgG / M) [[id number]] Collected:  07/06/18 0503    Specimen:  Blood Updated:  07/06/18 0552          Imaging Results (last 24 hours)     Procedure Component Value Units Date/Time    MRI Cervical Spine Without Contrast [[id number]] Updated:  07/06/18 1103             Intake/Output Summary (Last 24 hours) at 07/06/18 1627  Last data filed at 07/06/18 0932   Gross per 24 hour   Intake              820 ml   Output                0 ml   Net              820 ml       Physical Exam    Constitutional: He is oriented to person, place, and time. He appears well-developed and well-nourished. He is cooperative. He does not have a sickly appearance. No distress.   HENT:   Head: Normocephalic and atraumatic.   Right Ear: External ear normal.   Left Ear: External ear normal.   Nose: Nose normal.   Mouth/Throat: Oropharynx is clear and moist.   Eyes: Conjunctivae and EOM are normal. Pupils are equal, round, and reactive to light. No scleral icterus.   Neck: Neck supple. No JVD present. Carotid bruit is not present. No neck rigidity. No tracheal deviation present. No thyromegaly present.   There is minimal palpable spasm in the right posterior cervical region.  Passive range of motion appears to be within normal limits.  Active range of motion remains mildly decreased.     Cardiovascular: Normal rate, regular rhythm, normal heart sounds and intact distal pulses.    No murmur heard.  Pulmonary/Chest: Effort normal and breath sounds normal. No respiratory distress. He has no wheezes.   Abdominal: Soft. Bowel sounds are normal. He exhibits no distension. There is no  tenderness. A hernia is present. Hernia confirmed positive in the ventral area.   Musculoskeletal: Normal range of motion. He exhibits edema (trace  BLE). He exhibits no tenderness.   Neurological: He is alert and oriented to person, place, and time. No cranial nerve deficit. Coordination normal.   Skin: Skin is warm and dry. No rash noted.  There is a small red circular lesion noted on the medial right ankle consistent with fungal skin infection.    Psychiatric: He has a normal mood and affect. His behavior is normal. Judgment and thought content normal.     Results Review:  I have reviewed the labs, radiology results, and diagnostic studies since my last progress note and made treatment changes reflective of the results.   I have reviewed the current medications.    Assessment/Plan     Hospital Problem List     * (Principal)Intractable pain    Fever    Transaminitis    Cervicalgia    Hypertension    Headache          PLAN:  Continue IV doxycycline pending return of paronychia and RMSF studies    Ok Workman DO   07/06/18   4:27 PM

## 2018-07-06 NOTE — ACP (ADVANCE CARE PLANNING)
Date of First Steps ACP interview: 7/6/2018  Location of interview: Pt's room  First Steps ACP Facilitator: Ibeth Werner, RN  Referral Source: nurse  Present for facilitation: Patient    SUMMARY OF ADVANCE CARE PLANNING DISCUSSION:  Maxx visited for First Steps facilitation.     Maxx was sleeping soundly and family present ask that he not be disturbed.  Information packet along with contact numbers for facilitators left for pt to review when able to.     Education materials were provided on: Advance Care Planning and Healthcare Agent Selection         Advance care/living will document finished during this facilitation? no    Time spent on preparation, facilitation and documentation was under 30 minutes.    RECOMMENDATIONS/FOLLOW-UP:  Contact information left if pt desires further conversation.    CONSULT/NOTE ROUTED  yes    Ibeth Werner, RN

## 2018-07-06 NOTE — PROGRESS NOTES
Discharge Planning Assessment  [facility]     Patient Name: Maxx Siu  MRN: [id number]  Today's Date: 7/6/2018    Admit Date: 7/5/2018          Discharge Needs Assessment     Row Name 07/06/18 1043       Living Environment    Lives With alone    Current Living Arrangements home/apartment/condo    Primary Care Provided by self    Provides Primary Care For no one    Quality of Family Relationships supportive;helpful;involved    Able to Return to Prior Arrangements yes       Resource/Environmental Concerns    Resource/Environmental Concerns none       Transition Planning    Patient/Family Anticipates Transition to home    Patient/Family Anticipated Services at Transition none    Transportation Anticipated family or friend will provide       Discharge Needs Assessment    Readmission Within the Last 30 Days no previous admission in last 30 days    Concerns to be Addressed denies needs/concerns at this time    Equipment Currently Used at Home none    Anticipated Changes Related to Illness none    Equipment Needed After Discharge none    Discharge Coordination/Progress Pt lives at home and plans to return home at d/c. 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 He denies needs and does not want home health.            Discharge Plan    No documentation.       Destination     No service coordination in this encounter.      Durable Medical Equipment     No service coordination in this encounter.      Dialysis/Infusion     No service coordination in this encounter.      Home Medical Care     No service coordination in this encounter.      Social Care     No service coordination in this encounter.                Demographic Summary    No documentation.           Functional Status    No documentation.           Psychosocial    No documentation.           Abuse/Neglect    No documentation.           Legal    No documentation.           Substance Abuse    No documentation.           Patient Forms    No  documentation.         VETO Higgins

## 2018-07-06 NOTE — PLAN OF CARE
Problem: Patient Care Overview  Goal: Plan of Care Review  Outcome: Ongoing (interventions implemented as appropriate)   07/06/18 0334   Coping/Psychosocial   Plan of Care Reviewed With patient   Plan of Care Review   Progress no change   OTHER   Outcome Summary States he has very little pain, Refused offer offer of pain med. Sleeping throught the night. Neuro wnl. Up ad tee. MRI ordered for this morning.      Goal: Individualization and Mutuality  Outcome: Ongoing (interventions implemented as appropriate)    Goal: Discharge Needs Assessment  Outcome: Ongoing (interventions implemented as appropriate)    Goal: Interprofessional Rounds/Family Conf  Outcome: Ongoing (interventions implemented as appropriate)      Problem: Pain, Acute (Adult)  Goal: Acceptable Pain Control/Comfort Level  Outcome: Ongoing (interventions implemented as appropriate)

## 2018-07-07 LAB
ALBUMIN SERPL-MCNC: 3.4 G/DL (ref 3.5–5)
ALBUMIN/GLOB SERPL: 1.3 G/DL (ref 1.1–2.5)
ALP SERPL-CCNC: 66 U/L (ref 24–120)
ALT SERPL W P-5'-P-CCNC: 69 U/L (ref 0–54)
ANION GAP SERPL CALCULATED.3IONS-SCNC: 11 MMOL/L (ref 4–13)
AST SERPL-CCNC: 36 U/L (ref 7–45)
BASOPHILS # BLD AUTO: 0.03 10*3/MM3 (ref 0–0.2)
BASOPHILS NFR BLD AUTO: 0.3 % (ref 0–2)
BILIRUB SERPL-MCNC: 0.5 MG/DL (ref 0.1–1)
BUN BLD-MCNC: 37 MG/DL (ref 5–21)
BUN/CREAT SERPL: 23.6 (ref 7–25)
CALCIUM SPEC-SCNC: 8.9 MG/DL (ref 8.4–10.4)
CHLORIDE SERPL-SCNC: 99 MMOL/L (ref 98–110)
CO2 SERPL-SCNC: 31 MMOL/L (ref 24–31)
CREAT BLD-MCNC: 1.57 MG/DL (ref 0.5–1.4)
DEPRECATED RDW RBC AUTO: 44 FL (ref 40–54)
DEPRECATED RDW RBC AUTO: 45.1 FL (ref 40–54)
EOSINOPHIL # BLD AUTO: 0.03 10*3/MM3 (ref 0–0.7)
EOSINOPHIL NFR BLD AUTO: 0.3 % (ref 0–4)
ERYTHROCYTE [DISTWIDTH] IN BLOOD BY AUTOMATED COUNT: 13.4 % (ref 12–15)
ERYTHROCYTE [DISTWIDTH] IN BLOOD BY AUTOMATED COUNT: 13.5 % (ref 12–15)
GFR SERPL CREATININE-BSD FRML MDRD: 44 ML/MIN/1.73
GLOBULIN UR ELPH-MCNC: 2.7 GM/DL
GLUCOSE BLD-MCNC: 90 MG/DL (ref 70–100)
HCT VFR BLD AUTO: 45.3 % (ref 40–52)
HCT VFR BLD AUTO: 47.8 % (ref 40–52)
HGB BLD-MCNC: 15.4 G/DL (ref 14–18)
HGB BLD-MCNC: 15.8 G/DL (ref 14–18)
LYMPHOCYTES # BLD AUTO: 1.69 10*3/MM3 (ref 0.72–4.86)
LYMPHOCYTES # BLD MANUAL: 0.72 10*3/MM3 (ref 0.72–4.86)
LYMPHOCYTES NFR BLD AUTO: 15.4 % (ref 15–45)
LYMPHOCYTES NFR BLD MANUAL: 2 % (ref 4–12)
LYMPHOCYTES NFR BLD MANUAL: 7.1 % (ref 15–45)
MCH RBC QN AUTO: 29.9 PG (ref 28–32)
MCH RBC QN AUTO: 30.3 PG (ref 28–32)
MCHC RBC AUTO-ENTMCNC: 33.1 G/DL (ref 33–36)
MCHC RBC AUTO-ENTMCNC: 34 G/DL (ref 33–36)
MCV RBC AUTO: 89 FL (ref 82–95)
MCV RBC AUTO: 90.5 FL (ref 82–95)
MONOCYTES # BLD AUTO: 0.2 10*3/MM3 (ref 0.19–1.3)
MONOCYTES # BLD AUTO: 0.82 10*3/MM3 (ref 0.19–1.3)
MONOCYTES NFR BLD AUTO: 7.5 % (ref 4–12)
NEUTROPHILS # BLD AUTO: 7.92 10*3/MM3 (ref 1.87–8.4)
NEUTROPHILS # BLD AUTO: 8.35 10*3/MM3 (ref 1.87–8.4)
NEUTROPHILS NFR BLD AUTO: 75.8 % (ref 39–78)
NEUTROPHILS NFR BLD MANUAL: 74.5 % (ref 39–78)
NEUTS BAND NFR BLD MANUAL: 3.1 % (ref 0–10)
PLATELET # BLD AUTO: 115 10*3/MM3 (ref 130–400)
PLATELET # BLD AUTO: 122 10*3/MM3 (ref 130–400)
PMV BLD AUTO: 10.1 FL (ref 6–12)
PMV BLD AUTO: 9.5 FL (ref 6–12)
POIKILOCYTOSIS BLD QL SMEAR: ABNORMAL
POTASSIUM BLD-SCNC: 4 MMOL/L (ref 3.5–5.3)
PROT SERPL-MCNC: 6.1 G/DL (ref 6.3–8.7)
RBC # BLD AUTO: 5.09 10*6/MM3 (ref 4.8–5.9)
RBC # BLD AUTO: 5.28 10*6/MM3 (ref 4.8–5.9)
SMALL PLATELETS BLD QL SMEAR: ABNORMAL
SODIUM BLD-SCNC: 141 MMOL/L (ref 135–145)
VARIANT LYMPHS NFR BLD MANUAL: 13.3 % (ref 0–5)
WBC MORPH BLD: NORMAL
WBC NRBC COR # BLD: 10.21 10*3/MM3 (ref 4.8–10.8)
WBC NRBC COR # BLD: 11 10*3/MM3 (ref 4.8–10.8)

## 2018-07-07 PROCEDURE — 85025 COMPLETE CBC W/AUTO DIFF WBC: CPT | Performed by: INTERNAL MEDICINE

## 2018-07-07 PROCEDURE — 80053 COMPREHEN METABOLIC PANEL: CPT | Performed by: INTERNAL MEDICINE

## 2018-07-07 PROCEDURE — 25010000002 ENOXAPARIN PER 10 MG: Performed by: FAMILY MEDICINE

## 2018-07-07 PROCEDURE — 85025 COMPLETE CBC W/AUTO DIFF WBC: CPT | Performed by: FAMILY MEDICINE

## 2018-07-07 PROCEDURE — 94760 N-INVAS EAR/PLS OXIMETRY 1: CPT

## 2018-07-07 PROCEDURE — 94799 UNLISTED PULMONARY SVC/PX: CPT

## 2018-07-07 PROCEDURE — 85007 BL SMEAR W/DIFF WBC COUNT: CPT | Performed by: INTERNAL MEDICINE

## 2018-07-07 RX ORDER — DOXYCYCLINE 100 MG/1
100 TABLET ORAL EVERY 12 HOURS SCHEDULED
Status: DISCONTINUED | OUTPATIENT
Start: 2018-07-07 | End: 2018-07-08 | Stop reason: HOSPADM

## 2018-07-07 RX ORDER — SODIUM CHLORIDE 9 MG/ML
125 INJECTION, SOLUTION INTRAVENOUS CONTINUOUS
Status: DISCONTINUED | OUTPATIENT
Start: 2018-07-07 | End: 2018-07-08 | Stop reason: HOSPADM

## 2018-07-07 RX ADMIN — SODIUM CHLORIDE 125 ML/HR: 9 INJECTION, SOLUTION INTRAVENOUS at 16:33

## 2018-07-07 RX ADMIN — SODIUM CHLORIDE 1000 ML: 9 INJECTION, SOLUTION INTRAVENOUS at 15:13

## 2018-07-07 RX ADMIN — DOXYCYCLINE 100 MG: 100 INJECTION, POWDER, LYOPHILIZED, FOR SOLUTION INTRAVENOUS at 06:41

## 2018-07-07 RX ADMIN — LOSARTAN POTASSIUM 100 MG: 50 TABLET, FILM COATED ORAL at 08:14

## 2018-07-07 RX ADMIN — AMLODIPINE BESYLATE 2.5 MG: 5 TABLET ORAL at 08:14

## 2018-07-07 RX ADMIN — ENOXAPARIN SODIUM 40 MG: 40 INJECTION SUBCUTANEOUS at 17:49

## 2018-07-07 RX ADMIN — Medication 10 ML: at 06:41

## 2018-07-07 RX ADMIN — ATORVASTATIN CALCIUM 20 MG: 10 TABLET, FILM COATED ORAL at 08:14

## 2018-07-07 RX ADMIN — DOXYCYCLINE 100 MG: 100 TABLET ORAL at 17:48

## 2018-07-07 NOTE — PROGRESS NOTES
Orthopedic Spine Progress Note    Maxx Siu  73 y.o.  male  Subjective     Systemic or Specific Complaints:     Patient feeling somewhat better today.  Much less neck pain.  Wondering about the results of the tick bite tests.  Reviewed the results of the MRI with patient.  Mild spondylosis but no significant stenosis.  Patient somewhat anxious to go home.    Objective     Vital signs in last 24 hours:  Temp:  [97.5 °F (36.4 °C)-99.1 °F (37.3 °C)] 98 °F (36.7 °C)  Heart Rate:  [71-90] 90  Resp:  [16-19] 18  BP: (118-134)/(61-82) 121/61    Physical Exam:    Neck range of motion reasonably well-preserved.  Nontender to palpation of the paraspinous musculature or the trapezius.  Good strength in the deltoid, biceps, triceps.  Sensation intact in all nervous system uses both upper and lower extremity.    Diagnostic Data:  CBC:  Results from last 7 days  Lab Units 07/07/18  0446   WBC 10*3/mm3 11.00*   RBC 10*6/mm3 5.09   HEMOGLOBIN g/dL 15.4   HEMATOCRIT % 45.3   PLATELETS 10*3/mm3 122*          Assessment/Plan     1. Severe acute neck pain  2.  Probable tickborne illness      Plan:  1.  Okay to discharge from a spine perspective.  2.  Follow up as needed in the office if any recurrence of symptoms    JYOTI Woods MD    Date: 7/7/2018  Time: 10:57 AM

## 2018-07-07 NOTE — PLAN OF CARE
Problem: Patient Care Overview  Goal: Plan of Care Review  Outcome: Ongoing (interventions implemented as appropriate)   07/07/18 0412   Coping/Psychosocial   Plan of Care Reviewed With patient   Plan of Care Review   Progress improving   OTHER   Outcome Summary Patient states pain to neck about 2 out of 10. Refused offer of pain meds. Up adlib. VSS. IV abx, Safety maintained.      Goal: Individualization and Mutuality  Outcome: Ongoing (interventions implemented as appropriate)    Goal: Discharge Needs Assessment  Outcome: Ongoing (interventions implemented as appropriate)    Goal: Interprofessional Rounds/Family Conf  Outcome: Ongoing (interventions implemented as appropriate)      Problem: Pain, Acute (Adult)  Goal: Acceptable Pain Control/Comfort Level  Outcome: Ongoing (interventions implemented as appropriate)

## 2018-07-07 NOTE — PLAN OF CARE
Problem: Patient Care Overview  Goal: Plan of Care Review  Outcome: Ongoing (interventions implemented as appropriate)   07/07/18 1702   Coping/Psychosocial   Plan of Care Reviewed With patient   Plan of Care Review   Progress improving   OTHER   Outcome Summary Patient reports intermittent mild neck pain. No pain meds requested. Up adlib. VSS. IV fluid bolus with maintenance given for increased creat/bun. Possible DC tomorrow pending labs.       Problem: Pain, Acute (Adult)  Goal: Acceptable Pain Control/Comfort Level  Outcome: Ongoing (interventions implemented as appropriate)

## 2018-07-07 NOTE — PLAN OF CARE
Problem: Patient Care Overview  Goal: Plan of Care Review  Outcome: Ongoing (interventions implemented as appropriate)   07/06/18 4816   Coping/Psychosocial   Plan of Care Reviewed With patient;significant other   Plan of Care Review   Progress no change   OTHER   Outcome Summary Pt A&Ox4, denies any pain. Up adlib, IV abx. Continue to monitor. Safety maintained.      Goal: Interprofessional Rounds/Family Conf  Outcome: Ongoing (interventions implemented as appropriate)      Problem: Pain, Acute (Adult)  Goal: Acceptable Pain Control/Comfort Level  Outcome: Ongoing (interventions implemented as appropriate)

## 2018-07-07 NOTE — PROGRESS NOTES
[facility] Medicine Services  INPATIENT PROGRESS NOTE    Patient Name: Maxx Siu  Date of Admission: 7/5/2018  Today's Date: 07/07/18  Length of Stay: 2  Primary Care Physician: Gustavo Hairston MD    Subjective   Chief Complaint: neck pain  HPI   He states that he is feeling much better today.  He was actually hoping to go home.  However, he has gone into mild acute renal failure which I believe is secondary to recent Toradol administration and lack of adequate hydration.     Review of Systems   All pertinent negatives and positives are as above. All other systems have been reviewed and are negative unless otherwise stated.     Objective    Temp:  [97.5 °F (36.4 °C)-99.1 °F (37.3 °C)] 98 °F (36.7 °C)  Heart Rate:  [71-90] 82  Resp:  [16-19] 18  BP: (118-134)/(61-82) 118/72  Physical Exam   Constitutional: He is oriented to person, place, and time. He appears well-developed and well-nourished. No distress.   Up on the side of the bed.  No distress.  Family present.  Seen and discussed with his nurse, Miguel.   HENT:   Head: Normocephalic and atraumatic.   Eyes: EOM are normal. Pupils are equal, round, and reactive to light.   Neck: Neck supple.   Cardiovascular: Normal rate, regular rhythm, normal heart sounds and intact distal pulses.    Pulmonary/Chest: Effort normal and breath sounds normal.   Abdominal: Soft. Bowel sounds are normal.   Musculoskeletal: Normal range of motion. He exhibits no edema.   Lymphadenopathy:     He has no cervical adenopathy.   Neurological: He is alert and oriented to person, place, and time.   Skin: Skin is warm and dry. No rash noted.     Results Review:  I have reviewed the labs, radiology results, and diagnostic studies.    Laboratory Data:     Results from last 7 days  Lab Units 07/07/18  1319 07/07/18  0446 07/06/18  0503   WBC 10*3/mm3 10.21 11.00* 11.47*   HEMOGLOBIN g/dL 15.8 15.4 15.9   HEMATOCRIT % 47.8 45.3 46.5   PLATELETS 10*3/mm3  115* 122* 133       Results from last 7 days  Lab Units 07/07/18  1319 07/06/18  0503 07/05/18  0946   SODIUM mmol/L 141 140 136   POTASSIUM mmol/L 4.0 4.5 3.8   CHLORIDE mmol/L 99 99 97*   CO2 mmol/L 31.0 27.0 28.0   BUN mg/dL 37* 29* 15   CREATININE mg/dL 1.57* 1.32 0.82   CALCIUM mg/dL 8.9 8.8 8.9   BILIRUBIN mg/dL 0.5 0.7 0.8   ALK PHOS U/L 66 69 71   ALT (SGPT) U/L 69* 69* 60*   AST (SGOT) U/L 36 54* 42   GLUCOSE mg/dL 90 144* 108*     Culture Data:   Blood Culture   Date Value Ref Range Status   07/05/2018 No growth at 2 days  Preliminary   07/05/2018 No growth at 2 days  Preliminary     Radiology Data:   Imaging Results (last 24 hours)     Procedure Component Value Units Date/Time    MRI Cervical Spine Without Contrast [[id number]] Collected:  07/06/18 1723     Updated:  07/06/18 1735    Narrative:       EXAM:   MR CERVICAL SPINE WITHOUT IV CONTRAST 7/6/2018     COMPARISON: None.      HISTORY: 73 years-old Male.Abnormal xray, cervical spine, DJD; R52-Pain,  unspecified; M54.2-Cervicalgia     TECHNIQUE:   Routine pulse sequences of the cervical were obtained without IV  contrast.      FINDINGS:   Cervical spine:  Straightening of the normal lordosis of the cervical spine. No acute  compression deformity.  The bone marrow signal shows no evidence of fracture or a marrow  replacing process.  The cervical spinal cord signal is normal.  Disc desiccation and height loss at multiple levels.    The prevertebral soft tissues are unremarkable.        Degenerative changes:     C2-C3: Disc osteophyte complex and ligamentum flavum hypertrophy,  without significant thecal sac stenosis. Facet and uncovertebral  arthrosis, without significant left foraminal stenosis. There is mild  right foraminal stenosis.     C3-C4: Disc osteophyte complex and ligamentum flavum hypertrophy,  without significant thecal sac stenosis. Facet and uncovertebral  arthrosis, asymmetric to the left, resulting mild left foraminal  stenosis. No  significant right foraminal stenosis.     C4-C5: No significant thecal sac stenosis. Facet uncovertebral  arthrosis, without significant foraminal stenosis.     C5-C6: No significant thecal sac stenosis. Facet and uncovertebral  arthrosis, with resulting minimal left foraminal stenosis. No  significant right foraminal stenosis.     C6-C7: No significant thecal sac stenosis. Mild left foraminal stenosis  related to facet and uncovertebral arthrosis.     C7-T1: No significant thecal sac stenosis. No significant foraminal  stenosis.    Impression:       Cervical spine.  Multilevel degenerative changes. Please see above for level findings.  There is no significant thecal sac stenosis.  This report was finalized on 07/06/2018 17:32 by Dr. Kamala Aguiar MD.        I have reviewed the patient's current medications.     Assessment/Plan   Assessment:   1.  Cervicalgia, much improved.  2.  Headaches, much improved.   3.  Fevers, resolved.  4.  Concern for possible tickborne illness, specifically Ehrlichia:       - Mildly elevated transaminases.       - Mild thrombocytopenia.       - Atypical lymphocytosis.       - He may lack the classic leukopenia secondary to recent steroid administration.  5.  Acute renal failure secondary to volume depletion and recent Toradol administration.  6.  Systemic arterial hypertension.    Plan:   The patient presumptively has Ehrlichiosis.  He has known tick exposure with recent fever and flulike symptoms.  He demonstrates elevated transaminases, thrombocytopenia, and an atypical lymphocytosis on labs.  Change IV doxycycline to oral doxycycline today.  RMSF and Ehrlichia testing is still pending.     Lumbar puncture testing at [facility] was uneventful.    Dr. Woods saw here MRI noted.  No further recommendations from an orthopedic spine perspective.  Continue as needed Percocet and Zanaflex for neck pain and muscle spasms.    Bolus normal saline and continue on  aggressive maintenance rate.  The patient looks volume depleted on exam and has had no recent IV fluids.  He has not been eating or drinking that well given his constitutional symptoms.  He also received IV Toradol twice on 7/5.  Avoid further NSAIDs.  Hold losartan.  Repeat BMP in the morning.  If his renal function continues to improve, he can go home tomorrow.    Lovenox for DVT prophylaxis.    Increase activity.     Discharge Planning: I expect the patient to be discharged to home tomorrow if renal function improves.     Paddy Moody,    07/07/18   2:53 PM

## 2018-07-08 VITALS
HEIGHT: 68 IN | WEIGHT: 208.4 LBS | HEART RATE: 64 BPM | BODY MASS INDEX: 31.58 KG/M2 | RESPIRATION RATE: 16 BRPM | TEMPERATURE: 97.6 F | DIASTOLIC BLOOD PRESSURE: 77 MMHG | SYSTOLIC BLOOD PRESSURE: 154 MMHG | OXYGEN SATURATION: 98 %

## 2018-07-08 LAB
ALBUMIN SERPL-MCNC: 2.9 G/DL (ref 3.5–5)
ALBUMIN/GLOB SERPL: 1.2 G/DL (ref 1.1–2.5)
ALP SERPL-CCNC: 61 U/L (ref 24–120)
ALT SERPL W P-5'-P-CCNC: 63 U/L (ref 0–54)
ANION GAP SERPL CALCULATED.3IONS-SCNC: 10 MMOL/L (ref 4–13)
AST SERPL-CCNC: 27 U/L (ref 7–45)
BILIRUB SERPL-MCNC: 0.5 MG/DL (ref 0.1–1)
BUN BLD-MCNC: 31 MG/DL (ref 5–21)
BUN/CREAT SERPL: 22.8 (ref 7–25)
CALCIUM SPEC-SCNC: 8.5 MG/DL (ref 8.4–10.4)
CHLORIDE SERPL-SCNC: 108 MMOL/L (ref 98–110)
CO2 SERPL-SCNC: 26 MMOL/L (ref 24–31)
CREAT BLD-MCNC: 1.36 MG/DL (ref 0.5–1.4)
DEPRECATED RDW RBC AUTO: 43.8 FL (ref 40–54)
ERYTHROCYTE [DISTWIDTH] IN BLOOD BY AUTOMATED COUNT: 13.4 % (ref 12–15)
GFR SERPL CREATININE-BSD FRML MDRD: 52 ML/MIN/1.73
GLOBULIN UR ELPH-MCNC: 2.5 GM/DL
GLUCOSE BLD-MCNC: 95 MG/DL (ref 70–100)
HCT VFR BLD AUTO: 42.8 % (ref 40–52)
HGB BLD-MCNC: 14.5 G/DL (ref 14–18)
LYMPHOCYTES # BLD MANUAL: 2.3 10*3/MM3 (ref 0.72–4.86)
LYMPHOCYTES NFR BLD MANUAL: 10.2 % (ref 4–12)
LYMPHOCYTES NFR BLD MANUAL: 24.5 % (ref 15–45)
MCH RBC QN AUTO: 30.1 PG (ref 28–32)
MCHC RBC AUTO-ENTMCNC: 33.9 G/DL (ref 33–36)
MCV RBC AUTO: 89 FL (ref 82–95)
MONOCYTES # BLD AUTO: 0.96 10*3/MM3 (ref 0.19–1.3)
NEUTROPHILS # BLD AUTO: 4.78 10*3/MM3 (ref 1.87–8.4)
NEUTROPHILS NFR BLD MANUAL: 51 % (ref 39–78)
PLASMA CELL PREC NFR BLD MANUAL: 2 % (ref 0–0)
PLAT MORPH BLD: NORMAL
PLATELET # BLD AUTO: 122 10*3/MM3 (ref 130–400)
PMV BLD AUTO: 9.5 FL (ref 6–12)
POTASSIUM BLD-SCNC: 4.1 MMOL/L (ref 3.5–5.3)
PROT SERPL-MCNC: 5.4 G/DL (ref 6.3–8.7)
RBC # BLD AUTO: 4.81 10*6/MM3 (ref 4.8–5.9)
RBC MORPH BLD: NORMAL
SODIUM BLD-SCNC: 144 MMOL/L (ref 135–145)
VARIANT LYMPHS NFR BLD MANUAL: 12.2 % (ref 0–5)
WBC MORPH BLD: NORMAL
WBC NRBC COR # BLD: 9.38 10*3/MM3 (ref 4.8–10.8)

## 2018-07-08 PROCEDURE — 80053 COMPREHEN METABOLIC PANEL: CPT | Performed by: INTERNAL MEDICINE

## 2018-07-08 PROCEDURE — 85025 COMPLETE CBC W/AUTO DIFF WBC: CPT | Performed by: INTERNAL MEDICINE

## 2018-07-08 PROCEDURE — 85007 BL SMEAR W/DIFF WBC COUNT: CPT | Performed by: INTERNAL MEDICINE

## 2018-07-08 RX ORDER — DOXYCYCLINE 100 MG/1
100 TABLET ORAL EVERY 12 HOURS SCHEDULED
Qty: 14 TABLET | Refills: 0 | Status: SHIPPED | OUTPATIENT
Start: 2018-07-08 | End: 2018-07-15

## 2018-07-08 RX ORDER — LOSARTAN POTASSIUM 25 MG/1
25 TABLET ORAL DAILY
Qty: 30 TABLET | Refills: 1 | Status: SHIPPED | OUTPATIENT
Start: 2018-07-08 | End: 2021-06-17

## 2018-07-08 RX ORDER — TIZANIDINE 4 MG/1
4 TABLET ORAL EVERY 6 HOURS PRN
Qty: 12 TABLET | Refills: 0 | Status: SHIPPED | OUTPATIENT
Start: 2018-07-08

## 2018-07-08 RX ORDER — OXYCODONE AND ACETAMINOPHEN 7.5; 325 MG/1; MG/1
1 TABLET ORAL EVERY 6 HOURS PRN
Qty: 12 TABLET | Refills: 0 | Status: SHIPPED | OUTPATIENT
Start: 2018-07-08 | End: 2018-07-15

## 2018-07-08 RX ADMIN — AMLODIPINE BESYLATE 2.5 MG: 5 TABLET ORAL at 08:18

## 2018-07-08 RX ADMIN — DOXYCYCLINE 100 MG: 100 TABLET ORAL at 05:08

## 2018-07-08 RX ADMIN — ATORVASTATIN CALCIUM 20 MG: 10 TABLET, FILM COATED ORAL at 08:18

## 2018-07-08 RX ADMIN — SODIUM CHLORIDE 125 ML/HR: 9 INJECTION, SOLUTION INTRAVENOUS at 01:17

## 2018-07-08 NOTE — PLAN OF CARE
Problem: Patient Care Overview  Goal: Plan of Care Review  Outcome: Outcome(s) achieved Date Met: 07/08/18    Goal: Individualization and Mutuality  Outcome: Outcome(s) achieved Date Met: 07/08/18    Goal: Discharge Needs Assessment  Outcome: Outcome(s) achieved Date Met: 07/08/18    Goal: Interprofessional Rounds/Family Conf  Outcome: Outcome(s) achieved Date Met: 07/08/18      Problem: Pain, Acute (Adult)  Goal: Acceptable Pain Control/Comfort Level  Outcome: Outcome(s) achieved Date Met: 07/08/18

## 2018-07-08 NOTE — DISCHARGE SUMMARY
[facility]  DISCHARGE SUMMARY       Date of Admission: 7/5/2018  Date of Discharge:  7/8/2018  Primary Care Physician: Gustavo Hairston MD    Presenting Problem/History of Present Illness:  Headache, neck discomfort.     Final Discharge Diagnoses:  1.  Cervicalgia, much improved.  2.  Headaches, much improved.   3.  Fevers, resolved.  4.  Concern for possible tickborne illness, specifically Ehrlichia:       - Mildly elevated transaminases, resolving on doxycycline.       - Mild thrombocytopenia, resolving on doxycycline.       - Atypical lymphocytosis, resolving on doxycycline.       - He may lack the classic leukopenia secondary to recent steroid administration.  5.  Acute renal failure secondary to volume depletion and recent Toradol administration, improving after IVF.  6.  Systemic arterial hypertension.    Consults: Dr. Woods with orthopedic spine.     Procedures Performed: None.     Pertinent Test Results:   Imaging Results (last 7 days)     Procedure Component Value Units Date/Time    MRI Cervical Spine Without Contrast [[id number]] Collected:  07/06/18 1723     Updated:  07/06/18 1735    Narrative:       EXAM:   MR CERVICAL SPINE WITHOUT IV CONTRAST 7/6/2018     COMPARISON: None.      HISTORY: 73 years-old Male.Abnormal xray, cervical spine, DJD; R52-Pain,  unspecified; M54.2-Cervicalgia     TECHNIQUE:   Routine pulse sequences of the cervical were obtained without IV  contrast.      FINDINGS:   Cervical spine:  Straightening of the normal lordosis of the cervical spine. No acute  compression deformity.  The bone marrow signal shows no evidence of fracture or a marrow  replacing process.  The cervical spinal cord signal is normal.  Disc desiccation and height loss at multiple levels.    The prevertebral soft tissues are unremarkable.        Degenerative changes:     C2-C3: Disc osteophyte complex and ligamentum flavum hypertrophy,  without  significant thecal sac stenosis. Facet and uncovertebral  arthrosis, without significant left foraminal stenosis. There is mild  right foraminal stenosis.     C3-C4: Disc osteophyte complex and ligamentum flavum hypertrophy,  without significant thecal sac stenosis. Facet and uncovertebral  arthrosis, asymmetric to the left, resulting mild left foraminal  stenosis. No significant right foraminal stenosis.     C4-C5: No significant thecal sac stenosis. Facet uncovertebral  arthrosis, without significant foraminal stenosis.     C5-C6: No significant thecal sac stenosis. Facet and uncovertebral  arthrosis, with resulting minimal left foraminal stenosis. No  significant right foraminal stenosis.     C6-C7: No significant thecal sac stenosis. Mild left foraminal stenosis  related to facet and uncovertebral arthrosis.     C7-T1: No significant thecal sac stenosis. No significant foraminal  stenosis.    Impression:       Cervical spine.  Multilevel degenerative changes. Please see above for level findings.  There is no significant thecal sac stenosis.  This report was finalized on 07/06/2018 17:32 by Dr. Kamala Aguiar MD.    CT Cervical Spine Without Contrast [[id number]] Collected:  07/05/18 1108     Updated:  07/05/18 1113    Narrative:       CT CERVICAL SPINE WO CONTRAST- 7/5/2018 10:53 AM CDT     HISTORY: neck pain     COMPARISON: None      DOSE LENGTH PRODUCT: 306 mGy cm. Automated exposure control was also  utilized to decrease patient radiation dose.     TECHNIQUE: Serial helical tomographic images of the cervical spine were  obtained without the use of intravenous contrast. Additionally, sagittal  and coronal reformatted images were also provided for review.      FINDINGS:   Alignment: Normal.     Bones: There is no evidence of fracture. Vertebral body heights are  maintained.     Disc spaces: Maintained. Small osteophytes are present at C5-C6 and  C6-C7.     Canal and neuroforamina: Patent. Degenerative changes  are present in the  RIGHT C4-C5 and C5-C6 facet joints.     Soft tissues: Unremarkable.     Lung apices: Clear. No pneumothorax.       Impression:       1. Mild degenerative changes in the cervical spine without evidence of  acute bony abnormality.        This report was finalized on 07/05/2018 11:10 by Dr. Gustavo Kong MD.        Lab Results (last 7 days)     Procedure Component Value Units Date/Time    CBC Auto Differential [[id number]]  (Abnormal) Collected:  07/08/18 0505    Specimen:  Blood Updated:  07/08/18 0614     WBC 9.38 10*3/mm3      RBC 4.81 10*6/mm3      Hemoglobin 14.5 g/dL      Hematocrit 42.8 %      MCV 89.0 fL      MCH 30.1 pg      MCHC 33.9 g/dL      RDW 13.4 %      RDW-SD 43.8 fl      MPV 9.5 fL      Platelets 122 (L) 10*3/mm3     Manual Differential [[id number]]  (Abnormal) Collected:  07/08/18 0505    Specimen:  Blood Updated:  07/08/18 0614     Neutrophil % 51.0 %      Lymphocyte % 24.5 %      Monocyte % 10.2 %      Atypical Lymphocyte % 12.2 (H) %      Plasma Cells % 2.0 (H) %      Neutrophils Absolute 4.78 10*3/mm3      Lymphocytes Absolute 2.30 10*3/mm3      Monocytes Absolute 0.96 10*3/mm3      RBC Morphology Normal     WBC Morphology Normal     Platelet Morphology Normal    Comprehensive Metabolic Panel [[id number]]  (Abnormal) Collected:  07/08/18 0505    Specimen:  Blood Updated:  07/08/18 0535     Glucose 95 mg/dL      BUN 31 (H) mg/dL      Creatinine 1.36 mg/dL      Sodium 144 mmol/L      Potassium 4.1 mmol/L      Chloride 108 mmol/L      CO2 26.0 mmol/L      Calcium 8.5 mg/dL      Total Protein 5.4 (L) g/dL      Albumin 2.90 (L) g/dL      ALT (SGPT) 63 (H) U/L      AST (SGOT) 27 U/L      Alkaline Phosphatase 61 U/L      Total Bilirubin 0.5 mg/dL      eGFR Non African Amer 52 (L) mL/min/1.73      Globulin 2.5 gm/dL      A/G Ratio 1.2 g/dL      BUN/Creatinine Ratio 22.8     Anion Gap 10.0 mmol/L     Narrative:       The MDRD GFR formula is only valid for adults with stable renal  function between ages 18 and 70.    Manual Differential [[id number]]  (Abnormal) Collected:  07/07/18 1319    Specimen:  Blood Updated:  07/07/18 1427     Neutrophil % 74.5 %      Lymphocyte % 7.1 (L) %      Monocyte % 2.0 (L) %      Bands %  3.1 %      Atypical Lymphocyte % 13.3 (H) %      Neutrophils Absolute 7.92 10*3/mm3      Lymphocytes Absolute 0.72 10*3/mm3      Monocytes Absolute 0.20 10*3/mm3      Poikilocytes Slight/1+     WBC Morphology Normal     Platelet Estimate Decreased    CBC Auto Differential [[id number]]  (Abnormal) Collected:  07/07/18 1319    Specimen:  Blood Updated:  07/07/18 1427     WBC 10.21 10*3/mm3      RBC 5.28 10*6/mm3      Hemoglobin 15.8 g/dL      Hematocrit 47.8 %      MCV 90.5 fL      MCH 29.9 pg      MCHC 33.1 g/dL      RDW 13.5 %      RDW-SD 45.1 fl      MPV 9.5 fL      Platelets 115 (L) 10*3/mm3     Comprehensive Metabolic Panel [[id number]]  (Abnormal) Collected:  07/07/18 1319    Specimen:  Blood Updated:  07/07/18 1407     Glucose 90 mg/dL      BUN 37 (H) mg/dL      Creatinine 1.57 (H) mg/dL      Sodium 141 mmol/L      Potassium 4.0 mmol/L      Chloride 99 mmol/L      CO2 31.0 mmol/L      Calcium 8.9 mg/dL      Total Protein 6.1 (L) g/dL      Albumin 3.40 (L) g/dL      ALT (SGPT) 69 (H) U/L      AST (SGOT) 36 U/L      Alkaline Phosphatase 66 U/L      Total Bilirubin 0.5 mg/dL      eGFR Non African Amer 44 (L) mL/min/1.73      Globulin 2.7 gm/dL      A/G Ratio 1.3 g/dL      BUN/Creatinine Ratio 23.6     Anion Gap 11.0 mmol/L     Narrative:       The MDRD GFR formula is only valid for adults with stable renal function between ages 18 and 70.    Blood Culture - Blood, Blood, Venous Line [[id number]]  (Normal) Collected:  07/05/18 1154    Specimen:  Blood from Hand, Left Updated:  07/07/18 1230     Blood Culture No growth at 2 days    Blood Culture - Blood, Blood, Venous Line [[id number]]  (Normal) Collected:  07/05/18 1156    Specimen:  Blood from Hand, Left Updated:  07/07/18 1230      Blood Culture No growth at 2 days    CBC Auto Differential [[id number]]  (Abnormal) Collected:  07/07/18 0446    Specimen:  Blood Updated:  07/07/18 0553     WBC 11.00 (H) 10*3/mm3      RBC 5.09 10*6/mm3      Hemoglobin 15.4 g/dL      Hematocrit 45.3 %      MCV 89.0 fL      MCH 30.3 pg      MCHC 34.0 g/dL      RDW 13.4 %      RDW-SD 44.0 fl      MPV 10.1 fL      Platelets 122 (L) 10*3/mm3      Neutrophil % 75.8 %      Lymphocyte % 15.4 %      Monocyte % 7.5 %      Eosinophil % 0.3 %      Basophil % 0.3 %      Neutrophils, Absolute 8.35 10*3/mm3      Lymphocytes, Absolute 1.69 10*3/mm3      Monocytes, Absolute 0.82 10*3/mm3      Eosinophils, Absolute 0.03 10*3/mm3      Basophils, Absolute 0.03 10*3/mm3     Hemoglobin A1c [[id number]] Collected:  07/06/18 0503    Specimen:  Blood Updated:  07/06/18 0712     Hemoglobin A1C 6.2 %     Narrative:       Less than 6.0           Non-Diabetic Range  6.0-7.0                 ADA Therapeutic Target  Greater than 7.0        Action Suggested    TSH [[id number]]  (Normal) Collected:  07/06/18 0503    Specimen:  Blood Updated:  07/06/18 0641     TSH 0.738 mIU/mL     Comprehensive Metabolic Panel [[id number]]  (Abnormal) Collected:  07/06/18 0503    Specimen:  Blood Updated:  07/06/18 0641     Glucose 144 (H) mg/dL      BUN 29 (H) mg/dL      Creatinine 1.32 mg/dL      Sodium 140 mmol/L      Potassium 4.5 mmol/L      Chloride 99 mmol/L      CO2 27.0 mmol/L      Calcium 8.8 mg/dL      Total Protein 6.4 g/dL      Albumin 3.60 g/dL      ALT (SGPT) 69 (H) U/L      AST (SGOT) 54 (H) U/L      Alkaline Phosphatase 69 U/L      Total Bilirubin 0.7 mg/dL      eGFR Non African Amer 53 (L) mL/min/1.73      Globulin 2.8 gm/dL      A/G Ratio 1.3 g/dL      BUN/Creatinine Ratio 22.0     Anion Gap 14.0 (H) mmol/L     Narrative:       The MDRD GFR formula is only valid for adults with stable renal function between ages 18 and 70.    [facility] Auto Differential [[id number]]  (Abnormal) Collected:  07/06/18 7099     Specimen:  Blood Updated:  07/06/18 0558     WBC 11.47 (H) 10*3/mm3      RBC 5.22 10*6/mm3      Hemoglobin 15.9 g/dL      Hematocrit 46.5 %      MCV 89.1 fL      MCH 30.5 pg      MCHC 34.2 g/dL      RDW 13.3 %      RDW-SD 43.6 fl      MPV 9.4 fL      Platelets 133 10*3/mm3      Neutrophil % 89.1 (H) %      Lymphocyte % 6.0 (L) %      Monocyte % 3.7 (L) %      Eosinophil % 0.0 %      Basophil % 0.2 %      Immature Grans % 1.0 %      Neutrophils, Absolute 10.22 (H) 10*3/mm3      Lymphocytes, Absolute 0.69 (L) 10*3/mm3      Monocytes, Absolute 0.43 10*3/mm3      Eosinophils, Absolute 0.00 10*3/mm3      Basophils, Absolute 0.02 10*3/mm3      Immature Grans, Absolute 0.11 (H) 10*3/mm3      nRBC 0.0 /100 WBC     Ehrlichia Profile DNA PCR [[id number]] Collected:  07/06/18 0503    Specimen:  Blood Updated:  07/06/18 0553    Ehrlichia Chaffeensis PCR [[id number]] Collected:  07/06/18 0503    Specimen:  Blood Updated:  07/06/18 0553    [place] SF (IgG / M) [[id number]] Collected:  07/06/18 0503    Specimen:  Blood Updated:  07/06/18 0552    Procalcitonin [[id number]]  (Abnormal) Collected:  07/05/18 0946    Specimen:  Blood Updated:  07/05/18 1400     Procalcitonin 0.26 (H) ng/mL     C-reactive Protein [[id number]]  (Abnormal) Collected:  07/05/18 0946    Specimen:  Blood Updated:  07/05/18 1343     C-Reactive Protein 4.21 (H) mg/dL     Troponin [[id number]]  (Normal) Collected:  07/05/18 0946    Specimen:  Blood Updated:  07/05/18 1205     Troponin I 0.023 ng/mL     Sedimentation Rate [[id number]]  (Normal) Collected:  07/05/18 0946    Specimen:  Blood Updated:  07/05/18 1010     Sed Rate 3 mm/hr     Comprehensive Metabolic Panel [[id number]]  (Abnormal) Collected:  07/05/18 0946    Specimen:  Blood Updated:  07/05/18 1007     Glucose 108 (H) mg/dL      BUN 15 mg/dL      Creatinine 0.82 mg/dL      Sodium 136 mmol/L      Potassium 3.8 mmol/L      Chloride 97 (L) mmol/L      CO2 28.0 mmol/L      Calcium 8.9 mg/dL      Total  Protein 6.8 g/dL      Albumin 4.00 g/dL      ALT (SGPT) 60 (H) U/L      AST (SGOT) 42 U/L      Alkaline Phosphatase 71 U/L      Total Bilirubin 0.8 mg/dL      eGFR Non African Amer 93 mL/min/1.73      Globulin 2.8 gm/dL      A/G Ratio 1.4 g/dL      BUN/Creatinine Ratio 18.3     Anion Gap 11.0 mmol/L     Narrative:       The MDRD GFR formula is only valid for adults with stable renal function between ages 18 and 70.    CBC Auto Differential [[id number]]  (Abnormal) Collected:  07/05/18 0946    Specimen:  Blood Updated:  07/05/18 1001     WBC 9.65 10*3/mm3      RBC 5.49 10*6/mm3      Hemoglobin 16.4 g/dL      Hematocrit 48.7 %      MCV 88.7 fL      MCH 29.9 pg      MCHC 33.7 g/dL      RDW 13.2 %      RDW-SD 43.3 fl      MPV 8.4 fL      Platelets 129 (L) 10*3/mm3      Neutrophil % 81.8 (H) %      Lymphocyte % 10.2 (L) %      Monocyte % 4.9 %      Eosinophil % 0.1 %      Basophil % 0.5 %      Immature Grans % 2.5 %      Neutrophils, Absolute 7.90 10*3/mm3      Lymphocytes, Absolute 0.98 10*3/mm3      Monocytes, Absolute 0.47 10*3/mm3      Eosinophils, Absolute 0.01 10*3/mm3      Basophils, Absolute 0.05 10*3/mm3      Immature Grans, Absolute 0.24 (H) 10*3/mm3      nRBC 0.0 /100 WBC         Hospital Course:  The patient is a 73 y.o. male who presented to [facility] with headache and neck discomfort. He was initially seen at [facility].  CT scan of the head there showed no acute findings.  He ultimately underwent a lumbar puncture and the fluid studies were found to be within normal limits.  He was prescribed muscle relaxants, anti-inflammatories, and steroids.  The patient did not have any relief of his symptoms and decided to come to our facility.  He was admitted by Dr. Workman on 7/5.     The patient presumptively has Ehrlichiosis.  He has known tick exposure with recent fever and flulike symptoms. He demonstrates elevated transaminases (resolving), thrombocytopenia (improving), and an  "atypical lymphocytosis on labs.  Changed IV doxycycline to oral doxycycline on 7/7.  RMSF and Ehrlichia testing is still pending at discharge.      Dr. Woods saw here; MRI noted.  No further recommendations from an orthopedic spine perspective.  Continue as needed Percocet and Zanaflex for neck pain and muscle spasms.  I have asked the patient to avoid nonsteroidal anti-inflammatory drugs as this has likely made his renal function abnormal recently.     Bolused normal saline and continued on aggressive maintenance rate from the afternoon of 7/7 until now.  The patient looked volume depleted on exam and had no recent IV fluids.  He had not been eating or drinking that well given his constitutional symptoms.  He also received IV Toradol twice on 7/5.  Avoid further NSAIDs.  Held losartan; will decrease at discharge.      Repeat labs this week with PCP.  I told he and his family that I would call him with the results of his Ehrlichia and RMSF studies.     He desperately wants to go home this morning.  He had planned on going home yesterday, but I held him given his abnormal renal function.  He feels dramatically better.  He is eating and drinking without difficulty.  His renal function is improving with recent IV fluid administration.  Advised to continue to push fluids and advance diet at home.  He typically farms.  I have asked him to take it easy at least over the next week.  We talked about sun precautions while being on doxycycline.    Physical Exam on Discharge:  /77 (BP Location: Right arm, Patient Position: Lying)   Pulse 64   Temp 97.6 °F (36.4 °C) (Oral)   Resp 16   Ht 172.7 cm (68\")   Wt 94.5 kg (208 lb 6.4 oz)   SpO2 98%   BMI 31.69 kg/m²   Physical Exam  Constitutional: He is oriented to person, place, and time. He appears well-developed and well-nourished. No distress. Up on the side of the bed.  No distress.  Family present.  Discussed with his nurse, Miguel.   Head: Normocephalic and " atraumatic.   Eyes: EOM are normal. Pupils are equal, round, and reactive to light.   Neck: Neck supple.   Cardiovascular: Normal rate, regular rhythm, normal heart sounds and intact distal pulses.    Pulmonary/Chest: Effort normal and breath sounds normal.   Abdominal: Soft. Bowel sounds are normal.   Musculoskeletal: Normal range of motion. He exhibits no edema.   Lymphadenopathy: He has no cervical adenopathy.   Neurological: He is alert and oriented to person, place, and time.   Skin: Skin is warm and dry. No rash noted.     Condition on Discharge: Good.     Discharge Disposition:  Home or Self Care    Discharge Medications:     Discharge Medications      New Medications      Instructions Start Date   doxycycline 100 MG tablet  Commonly known as:  ADOXA   100 mg, Oral, Every 12 Hours Scheduled      oxyCODONE-acetaminophen 7.5-325 MG per tablet  Commonly known as:  PERCOCET   1 tablet, Oral, Every 6 Hours PRN         Changes to Medications      Instructions Start Date   losartan 25 MG tablet  Commonly known as:  COZAAR  What changed:  · medication strength  · how much to take   25 mg, Oral, Daily      tiZANidine 4 MG tablet  Commonly known as:  ZANAFLEX  What changed:  when to take this   4 mg, Oral, Every 6 Hours PRN         Continue These Medications      Instructions Start Date   amLODIPine 2.5 MG tablet  Commonly known as:  NORVASC   2.5 mg, Oral, Daily      simvastatin 40 MG tablet  Commonly known as:  ZOCOR   40 mg, Oral, Nightly           Discharge Diet:   Diet Instructions     Diet: Regular; Thin       Discharge Diet:  Regular    Fluid Consistency:  Thin        Activity at Discharge:   Activity Instructions     Activity as Tolerated       Other Instructions (Specify)       Limit sun exposure while taking doxycycline.  Wear SPF clothing and use sunscreen.        Follow-up Appointments:   Gustavo Hairston MD in 1 week.   Dr. Woods as needed.     Test Results Pending at Discharge: Ehrlichia and LOLLYF  studies.     Paddy Moody DO  07/08/18  7:58 AM    Time: 35 minutes.

## 2018-07-08 NOTE — PLAN OF CARE
Problem: Patient Care Overview  Goal: Plan of Care Review  Outcome: Ongoing (interventions implemented as appropriate)   07/08/18 0419   Coping/Psychosocial   Plan of Care Reviewed With patient   Plan of Care Review   Progress improving   OTHER   Outcome Summary No c/o pain tonight. Pt. hopeful he will be discharged today. Fluids maintained.        Problem: Pain, Acute (Adult)  Goal: Acceptable Pain Control/Comfort Level  Outcome: Ongoing (interventions implemented as appropriate)

## 2018-07-09 LAB
R RICKETTSI IGG SER QL IA: NEGATIVE
R RICKETTSI IGM TITR SER: 0.27 INDEX (ref 0–0.89)

## 2018-07-10 LAB
A PHAGOCYTOPH DNA BLD QL NAA+PROBE: NEGATIVE
BACTERIA SPEC AEROBE CULT: NORMAL
BACTERIA SPEC AEROBE CULT: NORMAL
E CHAFFEENSIS DNA BLD QL NAA+PROBE: POSITIVE
E CHAFFEENSIS DNA BLD QL NAA+PROBE: POSITIVE

## 2018-07-10 NOTE — DISCHARGE SUMMARY
I just received the results of Mr. Siu's Ehrlichia PCR.  It is indeed positive:        I called him at his home, [phone]. He states that he continues to feel better on a daily basis.  He has 4 more days of doxycycline to complete.  He has an appointment to see Dr. Hairston on Monday.    Paddy Moody,   07/10/18  3:26 PM

## 2018-08-29 ENCOUNTER — APPOINTMENT (OUTPATIENT)
Dept: LAB | Facility: HOSPITAL | Age: 73
End: 2018-08-29
Attending: INTERNAL MEDICINE

## 2018-08-29 ENCOUNTER — TRANSCRIBE ORDERS (OUTPATIENT)
Dept: LAB | Facility: HOSPITAL | Age: 73
End: 2018-08-29

## 2018-08-29 DIAGNOSIS — A77.40 EHRLICHIOSIS: ICD-10-CM

## 2018-08-29 DIAGNOSIS — R53.83 FATIGUE, UNSPECIFIED TYPE: ICD-10-CM

## 2018-08-29 LAB
ALBUMIN SERPL-MCNC: 4.5 G/DL (ref 3.5–5)
ALBUMIN/GLOB SERPL: 1.7 G/DL (ref 1.1–2.5)
ALP SERPL-CCNC: 74 U/L (ref 24–120)
ALT SERPL W P-5'-P-CCNC: 22 U/L (ref 0–54)
ANION GAP SERPL CALCULATED.3IONS-SCNC: 10 MMOL/L (ref 4–13)
AST SERPL-CCNC: 21 U/L (ref 7–45)
BASOPHILS # BLD AUTO: 0.02 10*3/MM3 (ref 0–0.2)
BASOPHILS NFR BLD AUTO: 0.2 % (ref 0–2)
BILIRUB SERPL-MCNC: 0.7 MG/DL (ref 0.1–1)
BUN BLD-MCNC: 17 MG/DL (ref 5–21)
BUN/CREAT SERPL: 20.2 (ref 7–25)
CALCIUM SPEC-SCNC: 9.6 MG/DL (ref 8.4–10.4)
CHLORIDE SERPL-SCNC: 108 MMOL/L (ref 98–110)
CK SERPL-CCNC: 82 U/L (ref 0–203)
CO2 SERPL-SCNC: 27 MMOL/L (ref 24–31)
CREAT BLD-MCNC: 0.84 MG/DL (ref 0.5–1.4)
CRP SERPL-MCNC: 0.58 MG/DL (ref 0–0.99)
DEPRECATED RDW RBC AUTO: 43.6 FL (ref 40–54)
EOSINOPHIL # BLD AUTO: 0.08 10*3/MM3 (ref 0–0.7)
EOSINOPHIL NFR BLD AUTO: 0.8 % (ref 0–4)
ERYTHROCYTE [DISTWIDTH] IN BLOOD BY AUTOMATED COUNT: 13 % (ref 12–15)
ERYTHROCYTE [SEDIMENTATION RATE] IN BLOOD: 5 MM/HR (ref 0–15)
FERRITIN SERPL-MCNC: 406 NG/ML (ref 17.9–464)
GFR SERPL CREATININE-BSD FRML MDRD: 90 ML/MIN/1.73
GLOBULIN UR ELPH-MCNC: 2.7 GM/DL
GLUCOSE BLD-MCNC: 103 MG/DL (ref 70–100)
HCT VFR BLD AUTO: 43.7 % (ref 40–52)
HGB BLD-MCNC: 14.6 G/DL (ref 14–18)
IMM GRANULOCYTES # BLD: 0.03 10*3/MM3 (ref 0–0.03)
IMM GRANULOCYTES NFR BLD: 0.3 % (ref 0–5)
LYMPHOCYTES # BLD AUTO: 4.28 10*3/MM3 (ref 0.72–4.86)
LYMPHOCYTES NFR BLD AUTO: 43.1 % (ref 15–45)
MCH RBC QN AUTO: 30.6 PG (ref 28–32)
MCHC RBC AUTO-ENTMCNC: 33.4 G/DL (ref 33–36)
MCV RBC AUTO: 91.6 FL (ref 82–95)
MONOCYTES # BLD AUTO: 0.5 10*3/MM3 (ref 0.19–1.3)
MONOCYTES NFR BLD AUTO: 5 % (ref 4–12)
NEUTROPHILS # BLD AUTO: 5.01 10*3/MM3 (ref 1.87–8.4)
NEUTROPHILS NFR BLD AUTO: 50.6 % (ref 39–78)
NRBC BLD MANUAL-RTO: 0 /100 WBC (ref 0–0)
PLATELET # BLD AUTO: 228 10*3/MM3 (ref 130–400)
PMV BLD AUTO: 9.3 FL (ref 6–12)
POTASSIUM BLD-SCNC: 4.1 MMOL/L (ref 3.5–5.3)
PROT SERPL-MCNC: 7.2 G/DL (ref 6.3–8.7)
RBC # BLD AUTO: 4.77 10*6/MM3 (ref 4.8–5.9)
SODIUM BLD-SCNC: 145 MMOL/L (ref 135–145)
T4 FREE SERPL-MCNC: 0.63 NG/DL (ref 0.78–2.19)
TSH SERPL DL<=0.05 MIU/L-ACNC: 3.48 MIU/ML (ref 0.47–4.68)
WBC NRBC COR # BLD: 9.92 10*3/MM3 (ref 4.8–10.8)

## 2018-08-29 PROCEDURE — 82728 ASSAY OF FERRITIN: CPT | Performed by: INTERNAL MEDICINE

## 2018-08-29 PROCEDURE — 84443 ASSAY THYROID STIM HORMONE: CPT | Performed by: INTERNAL MEDICINE

## 2018-08-29 PROCEDURE — 80053 COMPREHEN METABOLIC PANEL: CPT | Performed by: INTERNAL MEDICINE

## 2018-08-29 PROCEDURE — 82550 ASSAY OF CK (CPK): CPT | Performed by: INTERNAL MEDICINE

## 2018-08-29 PROCEDURE — 84439 ASSAY OF FREE THYROXINE: CPT | Performed by: INTERNAL MEDICINE

## 2018-08-29 PROCEDURE — 85025 COMPLETE CBC W/AUTO DIFF WBC: CPT | Performed by: INTERNAL MEDICINE

## 2018-08-29 PROCEDURE — 86140 C-REACTIVE PROTEIN: CPT | Performed by: INTERNAL MEDICINE

## 2018-08-29 PROCEDURE — 36415 COLL VENOUS BLD VENIPUNCTURE: CPT

## 2018-08-29 PROCEDURE — 85651 RBC SED RATE NONAUTOMATED: CPT | Performed by: INTERNAL MEDICINE

## 2018-09-27 ENCOUNTER — APPOINTMENT (OUTPATIENT)
Dept: LAB | Facility: HOSPITAL | Age: 73
End: 2018-09-27
Attending: INTERNAL MEDICINE

## 2018-09-27 ENCOUNTER — TRANSCRIBE ORDERS (OUTPATIENT)
Dept: ADMINISTRATIVE | Facility: HOSPITAL | Age: 73
End: 2018-09-27

## 2018-09-27 DIAGNOSIS — M25.551 CHRONIC ARTHRALGIAS OF KNEES AND HIPS: ICD-10-CM

## 2018-09-27 DIAGNOSIS — M25.552 CHRONIC ARTHRALGIAS OF KNEES AND HIPS: ICD-10-CM

## 2018-09-27 DIAGNOSIS — A77.40 EHRLICHIOSIS: ICD-10-CM

## 2018-09-27 DIAGNOSIS — M25.562 CHRONIC ARTHRALGIAS OF KNEES AND HIPS: ICD-10-CM

## 2018-09-27 DIAGNOSIS — G89.29 CHRONIC ARTHRALGIAS OF KNEES AND HIPS: ICD-10-CM

## 2018-09-27 DIAGNOSIS — M25.561 CHRONIC ARTHRALGIAS OF KNEES AND HIPS: ICD-10-CM

## 2018-09-27 LAB
ALBUMIN SERPL-MCNC: 4.2 G/DL (ref 3.5–5)
ALBUMIN/GLOB SERPL: 1.6 G/DL (ref 1.1–2.5)
ALP SERPL-CCNC: 71 U/L (ref 24–120)
ALT SERPL W P-5'-P-CCNC: 23 U/L (ref 0–54)
ANION GAP SERPL CALCULATED.3IONS-SCNC: 7 MMOL/L (ref 4–13)
AST SERPL-CCNC: 23 U/L (ref 7–45)
BASOPHILS # BLD AUTO: 0.03 10*3/MM3 (ref 0–0.2)
BASOPHILS NFR BLD AUTO: 0.3 % (ref 0–2)
BILIRUB SERPL-MCNC: 0.6 MG/DL (ref 0.1–1)
BUN BLD-MCNC: 13 MG/DL (ref 5–21)
BUN/CREAT SERPL: 13.4 (ref 7–25)
CALCIUM SPEC-SCNC: 9.5 MG/DL (ref 8.4–10.4)
CHLORIDE SERPL-SCNC: 105 MMOL/L (ref 98–110)
CO2 SERPL-SCNC: 29 MMOL/L (ref 24–31)
CREAT BLD-MCNC: 0.97 MG/DL (ref 0.5–1.4)
CRP SERPL-MCNC: 0.68 MG/DL (ref 0–0.99)
DEPRECATED RDW RBC AUTO: 39.9 FL (ref 40–54)
EOSINOPHIL # BLD AUTO: 0.09 10*3/MM3 (ref 0–0.7)
EOSINOPHIL NFR BLD AUTO: 0.8 % (ref 0–4)
ERYTHROCYTE [DISTWIDTH] IN BLOOD BY AUTOMATED COUNT: 12.4 % (ref 12–15)
ERYTHROCYTE [SEDIMENTATION RATE] IN BLOOD: 2 MM/HR (ref 0–15)
GFR SERPL CREATININE-BSD FRML MDRD: 76 ML/MIN/1.73
GLOBULIN UR ELPH-MCNC: 2.6 GM/DL
GLUCOSE BLD-MCNC: 101 MG/DL (ref 70–100)
HCT VFR BLD AUTO: 43.3 % (ref 40–52)
HGB BLD-MCNC: 14.5 G/DL (ref 14–18)
IMM GRANULOCYTES # BLD: 0.04 10*3/MM3 (ref 0–0.03)
IMM GRANULOCYTES NFR BLD: 0.4 % (ref 0–5)
LYMPHOCYTES # BLD AUTO: 4.09 10*3/MM3 (ref 0.72–4.86)
LYMPHOCYTES NFR BLD AUTO: 36.2 % (ref 15–45)
MCH RBC QN AUTO: 29.7 PG (ref 28–32)
MCHC RBC AUTO-ENTMCNC: 33.5 G/DL (ref 33–36)
MCV RBC AUTO: 88.7 FL (ref 82–95)
MONOCYTES # BLD AUTO: 0.6 10*3/MM3 (ref 0.19–1.3)
MONOCYTES NFR BLD AUTO: 5.3 % (ref 4–12)
NEUTROPHILS # BLD AUTO: 6.46 10*3/MM3 (ref 1.87–8.4)
NEUTROPHILS NFR BLD AUTO: 57 % (ref 39–78)
NRBC BLD MANUAL-RTO: 0 /100 WBC (ref 0–0)
PLATELET # BLD AUTO: 258 10*3/MM3 (ref 130–400)
PMV BLD AUTO: 9.4 FL (ref 6–12)
POTASSIUM BLD-SCNC: 4.2 MMOL/L (ref 3.5–5.3)
PROT SERPL-MCNC: 6.8 G/DL (ref 6.3–8.7)
RBC # BLD AUTO: 4.88 10*6/MM3 (ref 4.8–5.9)
SODIUM BLD-SCNC: 141 MMOL/L (ref 135–145)
WBC NRBC COR # BLD: 11.31 10*3/MM3 (ref 4.8–10.8)

## 2018-09-27 PROCEDURE — 36415 COLL VENOUS BLD VENIPUNCTURE: CPT | Performed by: INTERNAL MEDICINE

## 2018-09-27 PROCEDURE — 86140 C-REACTIVE PROTEIN: CPT | Performed by: INTERNAL MEDICINE

## 2018-09-27 PROCEDURE — 80053 COMPREHEN METABOLIC PANEL: CPT | Performed by: INTERNAL MEDICINE

## 2018-09-27 PROCEDURE — 85651 RBC SED RATE NONAUTOMATED: CPT | Performed by: INTERNAL MEDICINE

## 2018-09-27 PROCEDURE — 85025 COMPLETE CBC W/AUTO DIFF WBC: CPT | Performed by: INTERNAL MEDICINE

## 2021-06-17 ENCOUNTER — OFFICE VISIT (OUTPATIENT)
Dept: OTOLARYNGOLOGY | Facility: CLINIC | Age: 76
End: 2021-06-17

## 2021-06-17 VITALS
HEIGHT: 68 IN | BODY MASS INDEX: 28.49 KG/M2 | WEIGHT: 188 LBS | DIASTOLIC BLOOD PRESSURE: 68 MMHG | HEART RATE: 87 BPM | SYSTOLIC BLOOD PRESSURE: 126 MMHG

## 2021-06-17 DIAGNOSIS — R42 VERTIGO: ICD-10-CM

## 2021-06-17 DIAGNOSIS — G43.109 VERTIGINOUS MIGRAINE: ICD-10-CM

## 2021-06-17 DIAGNOSIS — G03.9 MENINGITIS: ICD-10-CM

## 2021-06-17 DIAGNOSIS — R52 INTRACTABLE PAIN: Primary | ICD-10-CM

## 2021-06-17 PROCEDURE — 99204 OFFICE O/P NEW MOD 45 MIN: CPT | Performed by: OTOLARYNGOLOGY

## 2021-06-17 RX ORDER — TOPIRAMATE 50 MG/1
50 TABLET, FILM COATED ORAL NIGHTLY
Qty: 30 TABLET | Refills: 1 | Status: SHIPPED | OUTPATIENT
Start: 2021-06-17

## 2021-06-17 RX ORDER — MECLIZINE HYDROCHLORIDE 25 MG/1
TABLET ORAL
COMMUNITY
Start: 2021-06-11

## 2021-06-17 RX ORDER — CLONAZEPAM 0.5 MG/1
0.5 TABLET ORAL 2 TIMES DAILY
Qty: 30 TABLET | Refills: 1 | Status: SHIPPED | OUTPATIENT
Start: 2021-06-17

## 2021-06-17 RX ORDER — TOPIRAMATE 50 MG/1
50 TABLET, FILM COATED ORAL NIGHTLY
Qty: 30 TABLET | Refills: 1 | Status: SHIPPED | OUTPATIENT
Start: 2021-06-17 | End: 2021-06-17

## 2021-06-17 RX ORDER — LOSARTAN POTASSIUM 100 MG/1
TABLET ORAL
COMMUNITY

## 2021-06-17 RX ORDER — AMLODIPINE BESYLATE 2.5 MG/1
TABLET ORAL
COMMUNITY

## 2021-06-17 NOTE — PATIENT INSTRUCTIONS
"Topiramate- 50 mg at bedtime    Klonopin- one tablet 2 times daily    See Primary care physician for complete workup for vertiginous migraines    Do not drive especially if taking Klonopin    VESTIBULAR EXERCISES:    Goals:  >To develop proprioceptive and visual mechanisms to compensate for a disturbance in balance function (labyrinthine system)  >To improve muscle coordination in general  T>o practice balancing under everyday conditions with special attention to using the eyes,  muscle and joint senses  >To train the movement of the eyes independent of the head  >To loosen the muscles of the neck and shoulder to overcome the protective muscular spasm and tendency to move \"in one piece\"  >To practice head movements that cause dizziness and thus gradually overcome the disability  >To encourage the restoration of self-confidence and easy spontaneous motion     Exercise Program:  A. Eye exercises (while seated in bed). Perform 5 to 10 minutes at least 5 times each day; first do slowly, then quickly. Perform 20 times each.  1.     Up and down  2.     Side to side  3.     Diagonal  4.     Focus on finger moving from 3 feet to one foot from face    B. Head exercises. To be done at first slowly with eyes open in primary position, then quickly. Later do with eyes closed. Perform 20 times each.  1.     Bending forward and backward  2.     Turning from side to side  3.     Tilting from side to side  4.     Diagonal movements    C. Coordinate the movement of head and eyes in the same direction as in B.    D. Shoulder shrugging and circling. Perform 20 times each.    E. While seated, bend forward and  objects from the ground. Perform 20 times.    F.  Standing exercises. Perform 20 times each.  1. Repeat section A  2. Change from a sitting to a standing position with the eyes open and shut.  3. Throw ball from hand to hand, above eye level             4. Throw ball from hand to hand, under knee  5. Change form from " sitting to standing, turning around in between     Full activity: Perform 10 times each.  1. Walk across room with eyes open, then closed  2. Walk up and down a slope with eyes open, then closed  3. Walk up and down steps with eyes open, then closed  4. Sit up and lie down in bed  5. Stand up and sit down in a chair  6. Recover balance when pushed in any direction  7. Throw and catch a ball  8. Any game involving stooping, straining and aiming     The following are of value in rehabilitating patients with balance problems:  1. A light cane may be used, not for walking, but to provide additional proprioceptive            orienting input.  2. While walking, the patient should not look down, but rather select a distant point for          fixation.  3. Night-lights should be left on in the patient’s home.  4. In-patients with cervical spine problems, especially in those that head turning        increase unsteadiness, the use of soft foam cervical collar limits motion and improves         stability. Exercises should be modified accordingly.  5. Mild central stimulants (Ritalin, Caffeine) are useful in alerting the patient to respond       quickly to orienting input.  6. Optimal visual correction should be achieved and maintained. If cataract surgery is           being performed, then contact lens are recommended to avoid optical distortion.  7.  Extreme fatigue and stress is to be avoided, as this may worsen the dizziness.  8.  Sedatives, vestibular suppressants and tranquilizers (Valium, Phenergan, Antivert, Dramamine, Klonopin, etc.) are to be avoided, as this may slow compensation by the brain and cause drowsiness.        CONTACT INFORMATION:  The main office phone number is [phone]. For emergencies after hours and on weekends, this number will convert over to our answering service and the on call provider will answer. Please try to keep non emergent phone calls/ questions to office hours 9am-5pm Monday through  Friday.     [employer]  As an alternative, you can sign up and use the Epic MyChart system for more direct and quicker access for non emergent questions/ problems.  [facility] [employer] allows you to send messages to your doctor, view your test results, renew your prescriptions, schedule appointments, and more. To sign up, go to Guest of a Guest and click on the Sign Up Now link in the New User? box. Enter your [employer] Activation Code exactly as it appears below along with the last four digits of your Social Security Number and your Date of Birth () to complete the sign-up process. If you do not sign up before the expiration date, you must request a new code.    [employer] Activation Code: Activation code not generated  Current [employer] Status: Active    If you have questions, you can email [employer] [email or web address] or call [phone] to talk to our [employer] staff. Remember, [employer] is NOT to be used for urgent needs. For medical emergencies, dial 911.

## 2021-06-17 NOTE — PROGRESS NOTES
[facility] Otolaryngology Head and Neck Surgery  CLINIC NOTE    Chief Complaint   Patient presents with   • Dizziness          HPI   New Patient  Accompanied by:  Wife  Maxx Siu is a  76 y.o. male with spinning head. He has been recently discharged from hospital for meningitis.  He has had spinal tap, blood patch, MRI. Reportedly negative. Thought Listeria Meningitis. Placed on antibiotics prior to spinal tap.  He notes dizziness for years.  He says small scale. He was dizzy. He feels like head is going to explode.  He feels like he will spin. He has severe headache with episodes.  He will have spinning for a few minutes. He says spinning will last 10 mintues and is unbearable. Headaches associated with spinning. Happens 5-6 times a day. When he turns at night, will set off. He has been at home the last 3 days.  Discharged from hospital Friday. Meningitis diagnosis did not make these worse.  He has never seen Neuro.   No prior medications. No prior workup.  Worsening the past year.  He does have positional component. Pain appears to be worse symptom.  Headaches- rate 10/10.   He says pain lasts a day or so. Spinning only lasts 10 mins.   Rx- Meclizine was no help. Execedrin Migraine- not sure.  Wife has noted- spells related to barometric pressure.  He had 2-3 attacks last night.  He is imbalanced. He has had attack today. He notes headache all day.  Smoke- none  Drink- rare, no change in headaches.   Sleep disrupted last 3 weeks because of headache and dizziness  In hospital, felt 1/2 comfortable with head to right in bed. Now at home, no direction is better. Complains of pain and frequent swaps symptoms of vertigo for headache pain. He states he does not have migraine pain (not knowing what migraine feels like). He is focused on intense headache.  Has issues with walking. Has to stand, stabilize and then can walk. Driving decreased.  Wife says he will sometimes have aversion to light and  sound.  Denies hearing change, fullness of ears.  Ringing- none.  He is status post No surgery found on No surgery found.        History     Last Reviewed by Satish Stevesn Jr., MD on 6/17/2021 at  2:01 PM    Sections Reviewed    Medical, Family, Tobacco, Surgical      Problem list reviewed by Satish Stevens Jr., MD on 6/17/2021 at  2:01 PM  Medicines reviewed by Satish Stevens Jr., MD on 6/17/2021 at  2:01 PM  Allergies reviewed by Satish Stevens Jr., MD on 6/17/2021 at  2:01 PM         Vital Signs:   Heart Rate:  [87] 87  BP: (126)/(68) 126/68    Physical Exam  Vitals reviewed.   Constitutional:       Appearance: Normal appearance. He is well-developed. He is obese.      Comments: Looks anxious   HENT:      Head: Normocephalic and atraumatic.      Right Ear: Tympanic membrane, ear canal and external ear normal. Decreased hearing noted.      Left Ear: Tympanic membrane, ear canal and external ear normal. Decreased hearing noted.      Nose: Mucosal edema (,ild, bluish) and rhinorrhea (mild) present. Rhinorrhea is clear.      Right Turbinates: Enlarged and pale.      Left Turbinates: Enlarged and pale.      Mouth/Throat:      Lips: Pink.      Mouth: Mucous membranes are dry.      Dentition: Normal dentition. No dental tenderness or gum lesions.      Tongue: No lesions. Tongue does not deviate from midline.      Palate: No mass and lesions.      Pharynx: Oropharynx is clear. Uvula midline.      Tonsils: No tonsillar exudate or tonsillar abscesses.   Eyes:      General: Lids are normal. Gaze aligned appropriately. No scleral icterus.     Extraocular Movements: Extraocular movements intact.      Right eye: No nystagmus.      Left eye: No nystagmus.      Pupils: Pupils are equal, round, and reactive to light.      Comments: Conjunctiva-slightly muddy   Neck:      Thyroid: No thyroid mass or thyromegaly.      Vascular: No carotid bruit.      Trachea: Trachea and phonation normal.    Cardiovascular:      Rate and Rhythm: Regular rhythm.      Heart sounds: Normal heart sounds.   Pulmonary:      Effort: Pulmonary effort is normal. No tachypnea.      Breath sounds: Normal breath sounds. No stridor.   Musculoskeletal:      Cervical back: Rigidity present. No crepitus. No pain with movement. Decreased range of motion.   Lymphadenopathy:      Cervical: No cervical adenopathy.   Skin:     Findings: No rash.   Neurological:      General: No focal deficit present.      Mental Status: He is alert and oriented to person, place, and time.      Cranial Nerves: Cranial nerves are intact. No cranial nerve deficit.      Gait: Gait abnormal (Mild unsteadiness).   Psychiatric:         Attention and Perception: Attention and perception normal.         Mood and Affect: Mood is anxious and depressed.         Speech: Speech normal.         Behavior: Behavior normal. Behavior is cooperative.         Thought Content: Thought content normal.         Cognition and Memory: Cognition normal.         Judgment: Judgment normal.             SnapShot   Notes   Encounters  Labs   Imaging   Computerlogy   Rslt Rev   :23}    Result Review    RESULTS REVIEW:    I have reviewed the patients old records in the chart.    ED to Hosp-Admission (Discharged) with Paddy Moody DO; Ok Workman DO (07/05/2018)  No other records available from imaging or hospital          Assessment:        Diagnosis Plan   1. Intractable pain  clonazePAM (KlonoPIN) 0.5 MG tablet    Headache   2. Vertiginous migraine  clonazePAM (KlonoPIN) 0.5 MG tablet    Worsening in the past year   3. Vertigo  clonazePAM (KlonoPIN) 0.5 MG tablet    Cannot describe direction   4. Meningitis      Recent admission of the hospital for this, no records available              Plan:        Conservative management.  Patient gives a somewhat confusing history.  He keeps going back to the explosive nature of his headaches.  He says he does have spinning which lasts  only a few minutes.  His headaches can last for days at a time.  He has had worsening headaches accompanied by spinning.  He has had no prior neurologic evaluation or treatment for vertiginous migraines.  He has had recent meningitis.  I have no records to corroborate this.  Apparently there is no etiology for his meningitis at this point in time.  He did have imaging.  Imaging apparently was reportedly normal except for what appears to sound like white matter disease.  His current lifestyle is very impaired by these constellation of symptoms.  Because of inconsistent history, symptoms consistent with vertiginous migraine, and severe headaches, I recommend he return to his primary care doctor and get a complete physical examination including neurological examination.  If they feel that he has vertiginous migraines, I recommend they seek neurologic consultation.  I will start the patient on Topamax and Klonopin at this point in time to see if he gets some symptomatic relief.  I do not feel vestibular testing is indicated for his current symptoms.  Since his symptoms do not appear to be correlated with his meningitis, I will defer to neurology regarding long-term therapy.  Should he require further work-up for this, I will start with audiogram.  I will see how he does over the next 2 weeks with his medications and proceed from there.  I have spent considerable amount of time trying to parse through his symptoms.  Vest exercise  Topamax 50 mg at bedtime  Klonopin 0.5 mg 2 times daily   PCP referral for full exam, possible Neurology refarral  Records from hospitalization recently     New Medications Ordered This Visit   Medications   • clonazePAM (KlonoPIN) 0.5 MG tablet     Sig: Take 1 tablet by mouth 2 (two) times a day.     Dispense:  30 tablet     Refill:  1   • topiramate (Topamax) 50 MG tablet     Sig: Take 1 tablet by mouth Every Night.     Dispense:  30 tablet     Refill:  1          MY CHART:  Patient is  Patient is using My Chart    Patient, Spouse understand(s) and agree(s) with the treatment plan as described.    Return Call in one week to report on medication, for Recheck medication effect.            Satish Stevens Jr, MD  06/17/21  14:23 CDT

## 2021-06-22 DIAGNOSIS — G43.109 VERTIGINOUS MIGRAINE: Primary | ICD-10-CM
